# Patient Record
Sex: MALE | Race: WHITE | NOT HISPANIC OR LATINO | URBAN - METROPOLITAN AREA
[De-identification: names, ages, dates, MRNs, and addresses within clinical notes are randomized per-mention and may not be internally consistent; named-entity substitution may affect disease eponyms.]

---

## 2017-04-21 ENCOUNTER — OUTPATIENT (OUTPATIENT)
Dept: OUTPATIENT SERVICES | Facility: HOSPITAL | Age: 67
LOS: 1 days | Discharge: HOME | End: 2017-04-21

## 2017-06-08 ENCOUNTER — OUTPATIENT (OUTPATIENT)
Dept: OUTPATIENT SERVICES | Facility: HOSPITAL | Age: 67
LOS: 1 days | Discharge: HOME | End: 2017-06-08

## 2017-06-08 DIAGNOSIS — S82.843A DISPLACED BIMALLEOLAR FRACTURE OF UNSPECIFIED LOWER LEG, INITIAL ENCOUNTER FOR CLOSED FRACTURE: ICD-10-CM

## 2017-06-28 DIAGNOSIS — Z12.11 ENCOUNTER FOR SCREENING FOR MALIGNANT NEOPLASM OF COLON: ICD-10-CM

## 2017-06-28 DIAGNOSIS — I10 ESSENTIAL (PRIMARY) HYPERTENSION: ICD-10-CM

## 2017-06-28 DIAGNOSIS — K62.89 OTHER SPECIFIED DISEASES OF ANUS AND RECTUM: ICD-10-CM

## 2017-06-28 DIAGNOSIS — E78.00 PURE HYPERCHOLESTEROLEMIA, UNSPECIFIED: ICD-10-CM

## 2017-06-28 DIAGNOSIS — Z85.038 PERSONAL HISTORY OF OTHER MALIGNANT NEOPLASM OF LARGE INTESTINE: ICD-10-CM

## 2017-06-28 DIAGNOSIS — K57.30 DIVERTICULOSIS OF LARGE INTESTINE WITHOUT PERFORATION OR ABSCESS WITHOUT BLEEDING: ICD-10-CM

## 2017-06-28 DIAGNOSIS — K64.9 UNSPECIFIED HEMORRHOIDS: ICD-10-CM

## 2017-09-22 DIAGNOSIS — I25.10 ATHEROSCLEROTIC HEART DISEASE OF NATIVE CORONARY ARTERY WITHOUT ANGINA PECTORIS: ICD-10-CM

## 2017-09-22 DIAGNOSIS — Z79.899 OTHER LONG TERM (CURRENT) DRUG THERAPY: ICD-10-CM

## 2017-09-22 DIAGNOSIS — E78.00 PURE HYPERCHOLESTEROLEMIA, UNSPECIFIED: ICD-10-CM

## 2017-09-30 ENCOUNTER — OUTPATIENT (OUTPATIENT)
Dept: OUTPATIENT SERVICES | Facility: HOSPITAL | Age: 67
LOS: 1 days | Discharge: HOME | End: 2017-09-30

## 2017-09-30 DIAGNOSIS — E78.00 PURE HYPERCHOLESTEROLEMIA, UNSPECIFIED: ICD-10-CM

## 2017-09-30 DIAGNOSIS — Z79.899 OTHER LONG TERM (CURRENT) DRUG THERAPY: ICD-10-CM

## 2017-09-30 DIAGNOSIS — S82.843A DISPLACED BIMALLEOLAR FRACTURE OF UNSPECIFIED LOWER LEG, INITIAL ENCOUNTER FOR CLOSED FRACTURE: ICD-10-CM

## 2017-09-30 DIAGNOSIS — I25.10 ATHEROSCLEROTIC HEART DISEASE OF NATIVE CORONARY ARTERY WITHOUT ANGINA PECTORIS: ICD-10-CM

## 2018-04-10 ENCOUNTER — OUTPATIENT (OUTPATIENT)
Dept: OUTPATIENT SERVICES | Facility: HOSPITAL | Age: 68
LOS: 1 days | Discharge: HOME | End: 2018-04-10

## 2018-04-10 DIAGNOSIS — E78.00 PURE HYPERCHOLESTEROLEMIA, UNSPECIFIED: ICD-10-CM

## 2018-04-10 DIAGNOSIS — I25.10 ATHEROSCLEROTIC HEART DISEASE OF NATIVE CORONARY ARTERY WITHOUT ANGINA PECTORIS: ICD-10-CM

## 2018-04-10 DIAGNOSIS — Z79.899 OTHER LONG TERM (CURRENT) DRUG THERAPY: ICD-10-CM

## 2018-10-31 ENCOUNTER — OUTPATIENT (OUTPATIENT)
Dept: OUTPATIENT SERVICES | Facility: HOSPITAL | Age: 68
LOS: 1 days | Discharge: HOME | End: 2018-10-31

## 2018-10-31 DIAGNOSIS — I25.10 ATHEROSCLEROTIC HEART DISEASE OF NATIVE CORONARY ARTERY WITHOUT ANGINA PECTORIS: ICD-10-CM

## 2018-10-31 DIAGNOSIS — Z79.899 OTHER LONG TERM (CURRENT) DRUG THERAPY: ICD-10-CM

## 2018-10-31 DIAGNOSIS — E78.00 PURE HYPERCHOLESTEROLEMIA, UNSPECIFIED: ICD-10-CM

## 2019-02-28 ENCOUNTER — RESULT REVIEW (OUTPATIENT)
Age: 69
End: 2019-02-28

## 2019-02-28 ENCOUNTER — OUTPATIENT (OUTPATIENT)
Dept: OUTPATIENT SERVICES | Facility: HOSPITAL | Age: 69
LOS: 1 days | Discharge: HOME | End: 2019-02-28

## 2019-02-28 ENCOUNTER — TRANSCRIPTION ENCOUNTER (OUTPATIENT)
Age: 69
End: 2019-02-28

## 2019-02-28 VITALS
RESPIRATION RATE: 18 BRPM | SYSTOLIC BLOOD PRESSURE: 159 MMHG | DIASTOLIC BLOOD PRESSURE: 89 MMHG | TEMPERATURE: 98 F | WEIGHT: 315 LBS | HEART RATE: 83 BPM | HEIGHT: 73 IN

## 2019-02-28 VITALS — RESPIRATION RATE: 18 BRPM | HEART RATE: 67 BPM | DIASTOLIC BLOOD PRESSURE: 65 MMHG | SYSTOLIC BLOOD PRESSURE: 102 MMHG

## 2019-02-28 DIAGNOSIS — M84.371A STRESS FRACTURE, RIGHT ANKLE, INITIAL ENCOUNTER FOR FRACTURE: Chronic | ICD-10-CM

## 2019-02-28 DIAGNOSIS — Z98.890 OTHER SPECIFIED POSTPROCEDURAL STATES: Chronic | ICD-10-CM

## 2019-02-28 NOTE — ASU PATIENT PROFILE, ADULT - PSH
Stress fracture of right ankle  plate and screws H/O colonoscopy  3 years ago rectal cancer  Stress fracture of right ankle  plate and screws

## 2019-03-01 LAB — SURGICAL PATHOLOGY STUDY: SIGNIFICANT CHANGE UP

## 2019-03-06 DIAGNOSIS — K64.8 OTHER HEMORRHOIDS: ICD-10-CM

## 2019-03-06 DIAGNOSIS — C20 MALIGNANT NEOPLASM OF RECTUM: ICD-10-CM

## 2019-03-06 DIAGNOSIS — K57.30 DIVERTICULOSIS OF LARGE INTESTINE WITHOUT PERFORATION OR ABSCESS WITHOUT BLEEDING: ICD-10-CM

## 2019-04-14 PROBLEM — I10 ESSENTIAL (PRIMARY) HYPERTENSION: Chronic | Status: ACTIVE | Noted: 2019-02-28

## 2019-04-14 PROBLEM — C20 MALIGNANT NEOPLASM OF RECTUM: Chronic | Status: ACTIVE | Noted: 2019-02-28

## 2019-04-14 PROBLEM — I48.91 UNSPECIFIED ATRIAL FIBRILLATION: Chronic | Status: ACTIVE | Noted: 2019-02-28

## 2019-04-14 PROBLEM — Z00.00 ENCOUNTER FOR PREVENTIVE HEALTH EXAMINATION: Status: ACTIVE | Noted: 2019-04-14

## 2019-06-06 ENCOUNTER — APPOINTMENT (OUTPATIENT)
Dept: CARDIOLOGY | Facility: CLINIC | Age: 69
End: 2019-06-06
Payer: MEDICARE

## 2019-06-06 PROCEDURE — 93000 ELECTROCARDIOGRAM COMPLETE: CPT

## 2019-06-06 PROCEDURE — 99214 OFFICE O/P EST MOD 30 MIN: CPT

## 2019-11-12 ENCOUNTER — APPOINTMENT (OUTPATIENT)
Dept: CARDIOLOGY | Facility: CLINIC | Age: 69
End: 2019-11-12
Payer: MEDICARE

## 2019-11-12 PROCEDURE — 93880 EXTRACRANIAL BILAT STUDY: CPT

## 2019-11-20 ENCOUNTER — APPOINTMENT (OUTPATIENT)
Dept: CARDIOLOGY | Facility: CLINIC | Age: 69
End: 2019-11-20
Payer: MEDICARE

## 2019-11-20 PROCEDURE — 93306 TTE W/DOPPLER COMPLETE: CPT

## 2019-11-29 ENCOUNTER — LABORATORY RESULT (OUTPATIENT)
Age: 69
End: 2019-11-29

## 2019-11-29 ENCOUNTER — OUTPATIENT (OUTPATIENT)
Dept: OUTPATIENT SERVICES | Facility: HOSPITAL | Age: 69
LOS: 1 days | Discharge: HOME | End: 2019-11-29

## 2019-11-29 DIAGNOSIS — E78.00 PURE HYPERCHOLESTEROLEMIA, UNSPECIFIED: ICD-10-CM

## 2019-11-29 DIAGNOSIS — Z98.890 OTHER SPECIFIED POSTPROCEDURAL STATES: Chronic | ICD-10-CM

## 2019-11-29 DIAGNOSIS — M84.371A STRESS FRACTURE, RIGHT ANKLE, INITIAL ENCOUNTER FOR FRACTURE: Chronic | ICD-10-CM

## 2019-11-29 DIAGNOSIS — Z79.899 OTHER LONG TERM (CURRENT) DRUG THERAPY: ICD-10-CM

## 2019-11-29 DIAGNOSIS — I25.10 ATHEROSCLEROTIC HEART DISEASE OF NATIVE CORONARY ARTERY WITHOUT ANGINA PECTORIS: ICD-10-CM

## 2019-12-05 ENCOUNTER — APPOINTMENT (OUTPATIENT)
Dept: CARDIOLOGY | Facility: CLINIC | Age: 69
End: 2019-12-05
Payer: MEDICARE

## 2019-12-05 PROCEDURE — 99214 OFFICE O/P EST MOD 30 MIN: CPT

## 2019-12-05 PROCEDURE — 93000 ELECTROCARDIOGRAM COMPLETE: CPT

## 2020-02-10 NOTE — ASU PATIENT PROFILE, ADULT - BILL OF RIGHTS/ADMISSION INFORMATION PROVIDED TO:
He should come in for a follow-up.  He should be able to hold aspirin for dental work, and should be on 81 mg daily.
Patient

## 2020-02-27 ENCOUNTER — RESULT REVIEW (OUTPATIENT)
Age: 70
End: 2020-02-27

## 2020-02-27 ENCOUNTER — TRANSCRIPTION ENCOUNTER (OUTPATIENT)
Age: 70
End: 2020-02-27

## 2020-02-27 ENCOUNTER — OUTPATIENT (OUTPATIENT)
Dept: OUTPATIENT SERVICES | Facility: HOSPITAL | Age: 70
LOS: 1 days | Discharge: HOME | End: 2020-02-27
Payer: MEDICARE

## 2020-02-27 VITALS — RESPIRATION RATE: 18 BRPM | DIASTOLIC BLOOD PRESSURE: 64 MMHG | SYSTOLIC BLOOD PRESSURE: 114 MMHG | HEART RATE: 68 BPM

## 2020-02-27 VITALS
DIASTOLIC BLOOD PRESSURE: 74 MMHG | HEART RATE: 73 BPM | TEMPERATURE: 99 F | RESPIRATION RATE: 19 BRPM | HEIGHT: 73 IN | WEIGHT: 315 LBS | SYSTOLIC BLOOD PRESSURE: 144 MMHG

## 2020-02-27 DIAGNOSIS — Z98.890 OTHER SPECIFIED POSTPROCEDURAL STATES: Chronic | ICD-10-CM

## 2020-02-27 DIAGNOSIS — Z12.12 ENCOUNTER FOR SCREENING FOR MALIGNANT NEOPLASM OF RECTUM: ICD-10-CM

## 2020-02-27 DIAGNOSIS — M84.371A STRESS FRACTURE, RIGHT ANKLE, INITIAL ENCOUNTER FOR FRACTURE: Chronic | ICD-10-CM

## 2020-02-27 PROCEDURE — 88305 TISSUE EXAM BY PATHOLOGIST: CPT | Mod: 26

## 2020-02-27 NOTE — H&P PST ADULT - NSICDXPASTSURGICALHX_GEN_ALL_CORE_FT
PAST SURGICAL HISTORY:  H/O colonoscopy 1 YEAR  AGO rectal cancer    Stress fracture of right ankle plate and screws

## 2020-03-02 LAB — SURGICAL PATHOLOGY STUDY: SIGNIFICANT CHANGE UP

## 2020-03-05 DIAGNOSIS — K59.8 OTHER SPECIFIED FUNCTIONAL INTESTINAL DISORDERS: ICD-10-CM

## 2020-03-05 DIAGNOSIS — I48.91 UNSPECIFIED ATRIAL FIBRILLATION: ICD-10-CM

## 2020-03-05 DIAGNOSIS — Z79.01 LONG TERM (CURRENT) USE OF ANTICOAGULANTS: ICD-10-CM

## 2020-03-05 DIAGNOSIS — Z85.048 PERSONAL HISTORY OF OTHER MALIGNANT NEOPLASM OF RECTUM, RECTOSIGMOID JUNCTION, AND ANUS: ICD-10-CM

## 2020-03-05 DIAGNOSIS — Z12.11 ENCOUNTER FOR SCREENING FOR MALIGNANT NEOPLASM OF COLON: ICD-10-CM

## 2020-03-05 DIAGNOSIS — K62.89 OTHER SPECIFIED DISEASES OF ANUS AND RECTUM: ICD-10-CM

## 2020-03-05 DIAGNOSIS — K64.8 OTHER HEMORRHOIDS: ICD-10-CM

## 2020-03-05 DIAGNOSIS — K57.30 DIVERTICULOSIS OF LARGE INTESTINE WITHOUT PERFORATION OR ABSCESS WITHOUT BLEEDING: ICD-10-CM

## 2020-03-05 DIAGNOSIS — I10 ESSENTIAL (PRIMARY) HYPERTENSION: ICD-10-CM

## 2020-06-11 ENCOUNTER — APPOINTMENT (OUTPATIENT)
Dept: CARDIOLOGY | Facility: CLINIC | Age: 70
End: 2020-06-11

## 2020-06-19 ENCOUNTER — LABORATORY RESULT (OUTPATIENT)
Age: 70
End: 2020-06-19

## 2020-06-30 ENCOUNTER — APPOINTMENT (OUTPATIENT)
Dept: CARDIOLOGY | Facility: CLINIC | Age: 70
End: 2020-06-30
Payer: MEDICARE

## 2020-06-30 PROCEDURE — 93000 ELECTROCARDIOGRAM COMPLETE: CPT

## 2020-06-30 PROCEDURE — 99214 OFFICE O/P EST MOD 30 MIN: CPT

## 2020-10-06 ENCOUNTER — RX RENEWAL (OUTPATIENT)
Age: 70
End: 2020-10-06

## 2020-10-08 ENCOUNTER — RECORD ABSTRACTING (OUTPATIENT)
Age: 70
End: 2020-10-08

## 2020-10-08 DIAGNOSIS — I36.0 NONRHEUMATIC TRICUSPID (VALVE) STENOSIS: ICD-10-CM

## 2020-10-08 DIAGNOSIS — I65.23 OCCLUSION AND STENOSIS OF BILATERAL CAROTID ARTERIES: ICD-10-CM

## 2020-10-08 DIAGNOSIS — I34.0 NONRHEUMATIC MITRAL (VALVE) INSUFFICIENCY: ICD-10-CM

## 2020-10-10 ENCOUNTER — INPATIENT (INPATIENT)
Facility: HOSPITAL | Age: 70
LOS: 3 days | Discharge: HOME HEALTH | End: 2020-10-14
Attending: INTERNAL MEDICINE | Admitting: INTERNAL MEDICINE
Payer: MEDICARE

## 2020-10-10 VITALS
HEIGHT: 73 IN | HEART RATE: 83 BPM | DIASTOLIC BLOOD PRESSURE: 62 MMHG | SYSTOLIC BLOOD PRESSURE: 116 MMHG | TEMPERATURE: 96 F | OXYGEN SATURATION: 93 % | WEIGHT: 315 LBS | RESPIRATION RATE: 18 BRPM

## 2020-10-10 DIAGNOSIS — M84.371A STRESS FRACTURE, RIGHT ANKLE, INITIAL ENCOUNTER FOR FRACTURE: Chronic | ICD-10-CM

## 2020-10-10 DIAGNOSIS — Z98.890 OTHER SPECIFIED POSTPROCEDURAL STATES: Chronic | ICD-10-CM

## 2020-10-10 LAB
ALBUMIN SERPL ELPH-MCNC: 3.9 G/DL — SIGNIFICANT CHANGE UP (ref 3.5–5.2)
ALP SERPL-CCNC: 89 U/L — SIGNIFICANT CHANGE UP (ref 30–115)
ALT FLD-CCNC: 15 U/L — SIGNIFICANT CHANGE UP (ref 0–41)
ANION GAP SERPL CALC-SCNC: 14 MMOL/L — SIGNIFICANT CHANGE UP (ref 7–14)
APTT BLD: 29.6 SEC — SIGNIFICANT CHANGE UP (ref 27–39.2)
AST SERPL-CCNC: 25 U/L — SIGNIFICANT CHANGE UP (ref 0–41)
BASOPHILS # BLD AUTO: 0.05 K/UL — SIGNIFICANT CHANGE UP (ref 0–0.2)
BASOPHILS NFR BLD AUTO: 0.4 % — SIGNIFICANT CHANGE UP (ref 0–1)
BILIRUB SERPL-MCNC: 0.4 MG/DL — SIGNIFICANT CHANGE UP (ref 0.2–1.2)
BUN SERPL-MCNC: 17 MG/DL — SIGNIFICANT CHANGE UP (ref 10–20)
CALCIUM SERPL-MCNC: 8.9 MG/DL — SIGNIFICANT CHANGE UP (ref 8.5–10.1)
CHLORIDE SERPL-SCNC: 104 MMOL/L — SIGNIFICANT CHANGE UP (ref 98–110)
CO2 SERPL-SCNC: 22 MMOL/L — SIGNIFICANT CHANGE UP (ref 17–32)
CREAT SERPL-MCNC: 1.5 MG/DL — SIGNIFICANT CHANGE UP (ref 0.7–1.5)
EOSINOPHIL # BLD AUTO: 0.08 K/UL — SIGNIFICANT CHANGE UP (ref 0–0.7)
EOSINOPHIL NFR BLD AUTO: 0.6 % — SIGNIFICANT CHANGE UP (ref 0–8)
GLUCOSE SERPL-MCNC: 137 MG/DL — HIGH (ref 70–99)
HCT VFR BLD CALC: 36.4 % — LOW (ref 42–52)
HGB BLD-MCNC: 12.2 G/DL — LOW (ref 14–18)
IMM GRANULOCYTES NFR BLD AUTO: 1.1 % — HIGH (ref 0.1–0.3)
INR BLD: 1.4 RATIO — HIGH (ref 0.65–1.3)
LACTATE SERPL-SCNC: 3.5 MMOL/L — HIGH (ref 0.7–2)
LYMPHOCYTES # BLD AUTO: 1.24 K/UL — SIGNIFICANT CHANGE UP (ref 1.2–3.4)
LYMPHOCYTES # BLD AUTO: 9.5 % — LOW (ref 20.5–51.1)
MAGNESIUM SERPL-MCNC: 2 MG/DL — SIGNIFICANT CHANGE UP (ref 1.8–2.4)
MCHC RBC-ENTMCNC: 31.4 PG — HIGH (ref 27–31)
MCHC RBC-ENTMCNC: 33.5 G/DL — SIGNIFICANT CHANGE UP (ref 32–37)
MCV RBC AUTO: 93.8 FL — SIGNIFICANT CHANGE UP (ref 80–94)
MONOCYTES # BLD AUTO: 0.94 K/UL — HIGH (ref 0.1–0.6)
MONOCYTES NFR BLD AUTO: 7.2 % — SIGNIFICANT CHANGE UP (ref 1.7–9.3)
NEUTROPHILS # BLD AUTO: 10.64 K/UL — HIGH (ref 1.4–6.5)
NEUTROPHILS NFR BLD AUTO: 81.2 % — HIGH (ref 42.2–75.2)
NRBC # BLD: 0 /100 WBCS — SIGNIFICANT CHANGE UP (ref 0–0)
PLATELET # BLD AUTO: 251 K/UL — SIGNIFICANT CHANGE UP (ref 130–400)
POTASSIUM SERPL-MCNC: 4.5 MMOL/L — SIGNIFICANT CHANGE UP (ref 3.5–5)
POTASSIUM SERPL-SCNC: 4.5 MMOL/L — SIGNIFICANT CHANGE UP (ref 3.5–5)
PROT SERPL-MCNC: 6.6 G/DL — SIGNIFICANT CHANGE UP (ref 6–8)
PROTHROM AB SERPL-ACNC: 16.1 SEC — HIGH (ref 9.95–12.87)
RAPID RVP RESULT: SIGNIFICANT CHANGE UP
RBC # BLD: 3.88 M/UL — LOW (ref 4.7–6.1)
RBC # FLD: 13.2 % — SIGNIFICANT CHANGE UP (ref 11.5–14.5)
SARS-COV-2 RNA SPEC QL NAA+PROBE: SIGNIFICANT CHANGE UP
SODIUM SERPL-SCNC: 140 MMOL/L — SIGNIFICANT CHANGE UP (ref 135–146)
TROPONIN T SERPL-MCNC: <0.01 NG/ML — SIGNIFICANT CHANGE UP
WBC # BLD: 13.1 K/UL — HIGH (ref 4.8–10.8)
WBC # FLD AUTO: 13.1 K/UL — HIGH (ref 4.8–10.8)

## 2020-10-10 PROCEDURE — 70450 CT HEAD/BRAIN W/O DYE: CPT | Mod: 26

## 2020-10-10 RX ORDER — TETANUS TOXOID, REDUCED DIPHTHERIA TOXOID AND ACELLULAR PERTUSSIS VACCINE, ADSORBED 5; 2.5; 8; 8; 2.5 [IU]/.5ML; [IU]/.5ML; UG/.5ML; UG/.5ML; UG/.5ML
0.5 SUSPENSION INTRAMUSCULAR ONCE
Refills: 0 | Status: COMPLETED | OUTPATIENT
Start: 2020-10-10 | End: 2020-10-10

## 2020-10-10 RX ORDER — ACETAMINOPHEN 500 MG
975 TABLET ORAL ONCE
Refills: 0 | Status: COMPLETED | OUTPATIENT
Start: 2020-10-10 | End: 2020-10-10

## 2020-10-10 RX ORDER — SODIUM CHLORIDE 9 MG/ML
1000 INJECTION INTRAMUSCULAR; INTRAVENOUS; SUBCUTANEOUS ONCE
Refills: 0 | Status: COMPLETED | OUTPATIENT
Start: 2020-10-10 | End: 2020-10-10

## 2020-10-10 RX ADMIN — Medication 975 MILLIGRAM(S): at 22:05

## 2020-10-10 RX ADMIN — SODIUM CHLORIDE 1000 MILLILITER(S): 9 INJECTION INTRAMUSCULAR; INTRAVENOUS; SUBCUTANEOUS at 19:53

## 2020-10-10 RX ADMIN — Medication 975 MILLIGRAM(S): at 18:11

## 2020-10-10 RX ADMIN — SODIUM CHLORIDE 1000 MILLILITER(S): 9 INJECTION INTRAMUSCULAR; INTRAVENOUS; SUBCUTANEOUS at 21:52

## 2020-10-10 RX ADMIN — Medication 975 MILLIGRAM(S): at 19:00

## 2020-10-10 RX ADMIN — TETANUS TOXOID, REDUCED DIPHTHERIA TOXOID AND ACELLULAR PERTUSSIS VACCINE, ADSORBED 0.5 MILLILITER(S): 5; 2.5; 8; 8; 2.5 SUSPENSION INTRAMUSCULAR at 17:44

## 2020-10-10 RX ADMIN — SODIUM CHLORIDE 1000 MILLILITER(S): 9 INJECTION INTRAMUSCULAR; INTRAVENOUS; SUBCUTANEOUS at 19:12

## 2020-10-10 NOTE — ED PROVIDER NOTE - ATTENDING CONTRIBUTION TO CARE
71yo M history of HTN DL rectal ca in remission sp chemo/rad, AFib on Xarelto presenting with epistaxis. Per pt, had accidentally struck his head 1wk ago while working in the garage and standing up. Sustained some bruising to L cheek. no LOC, no other injuries. Per pt, he was in the bathroom approx 20min PTA when he began to have spontaneous epistaxis from R nare. Came in for further eval. Per pt and wife, no new trauma since last week. No other complaints. No HA vision changes neck pain/stiffness, chest pain, shortness of breath, abdominal pain, numbness/focal weakness. Unknown last tdap  Constitutional: Well appearing. No acute distress. Non toxic.   Head: +abrasions and ecchymosis to L face  Eyes: PERRLA. Extraocular movements intact, no entrapment. Conjunctiva normal.   Ears: No mastoid tenderness.  Nose: +R nare nasal polyp, small oozing. No septal hematoma.   Throat: +MMM. No posterior oropharyngeal erythema, edema, lesions.  Uvula midline. Floor of mouth soft. Dried blood around lips. No blood in posterior pharynx. mild ecchymosis to R lower lip.   Neck: Supple, non tender, full range of motion.  CV: RRR no murmurs, rubs, or gallops. +S1S2.   Pulm: Clear to auscultation bilaterally. Normal work of breathing.  Abd: soft NT ND +BS.   Ext: Warm and well perfused x4, moving all extremities, no edema.   Psy: Cooperative, appropriate.   Skin: Warm, dry, no rash  Neuro: CN2-12 grossly intact no sensory or motor deficits throughout, no drift, no ataxia

## 2020-10-10 NOTE — ED PROVIDER NOTE - NS ED ROS FT
The patient is a 28y Male complaining of
Review of Systems    Constitutional: (-) fever, (-) chills  Eyes/ENT: (-) blurry vision, (+) epistaxis, (-) sore throat  Cardiovascular: (-) chest pain, (-) syncope  Respiratory: (-) cough, (-) shortness of breath  Gastrointestinal: (-) pain, (-) nausea, (-) vomiting, (-) diarrhea  Musculoskeletal: (-) neck pain, (-) back pain, (-) body aches  Integumentary: (-) rash, (-) edema  Neurological: (-) headache, (-) altered mental status  Psychiatric: (-) hallucinations  Allergic/Immunologic: (-) pruritus

## 2020-10-10 NOTE — ED PROVIDER NOTE - OBJECTIVE STATEMENT
69 yo M hx of A-fib on Xarelto c/o nose bleed today from right nostril. Patient also reports that he hit his head on the garage while getting up 1 week ago. Patient states he might have scraped his face while shaving. Last tetanus unknown. No  HA, dizzy, LOC, n/v, or change in visions.

## 2020-10-10 NOTE — ED PROVIDER NOTE - PROGRESS NOTE DETAILS
sp returning from CT, pt sitting in stretcher, c/o dizziness. BP 80s. remaining vs within normal limits. FS nml. quikclot and tongue depressors removed, no reoozing at this time. pt with no other new complaints. no ha vision changes, cp, sob. labs ordered. ivf bolus running. will cont reeval. pt s/o Dr. Magdaleno pending labs/reeval Patient still dizzy after 1 liter of IVF. Labs checked.  Lactate 4.5.  Will admit. Patient still dizzy after 1 liter of IVF. Labs checked.  Lactate 4.5.  Will admit. Patient states that abrasions just happened and could not remember how it happened. Dr. Simon accepts admission. Called by radiology for CT cervical spine finding of possible C2 fracture. Placed patient on hard collar. Will transfer [place] for trauma evaluation. Discussed with Dr. Bernardo. MQ: Pending CTA neck, neurosurgery consulted, trauma team saw patient patient stable during transfer, patient evaluated here for c2 fracture. on exam patient reports synocpal event. lab work was significant for elevated lactate. Nsg requesting cta of neck given location of fx. WF: received s/o from resident sandoval, pending CTA head and neck and neurosurg and trauma recs. pt vitals stable, NAD. WF: received s/o from resident sandoval, pending CTA head and neck and neurosurg and trauma recs. pt vitals stable, NAD. As per pt, he was found in bathroom yesterday by wife and thinks he syncopized after standing up, was started on benazepril recently. denies pain currently. WF: received s/o from resident sandoval, pending CTA H/N read, if negative will admit to medicine for syncope work-up. NAD. As per pt, he was found in bathroom yesterday by wife and thinks he syncopized after standing up. denies pain currently. will check orthostatics/ WF: received s/o from resident sandoval, pending CTA H/N read, if negative will admit to medicine for syncope work-up. NAD. As per pt, he was found in bathroom yesterday by wife and thinks he syncopized after standing up. denies pain currently. negative for orthostatic hypotension. WF: received s/o from resident sandoval, pending CTA H/N read, if negative will admit to medicine for syncope work-up. NAD, no neuro deficits. As per pt, he was found in bathroom yesterday by wife and thinks he syncopized after standing up. denies pain currently. negative for orthostatic hypotension. Bradley: pt is NAD, comfortable. awaiting CTA result.

## 2020-10-10 NOTE — ED PROVIDER NOTE - CARE PLAN
Principal Discharge DX:	Syncope  Secondary Diagnosis:	Facial abrasion  Secondary Diagnosis:	Closed head injury   Principal Discharge DX:	C2 cervical fracture  Secondary Diagnosis:	Facial abrasion  Secondary Diagnosis:	Closed head injury  Secondary Diagnosis:	Syncope

## 2020-10-10 NOTE — ED ADULT NURSE NOTE - NSIMPLEMENTINTERV_GEN_ALL_ED
Implemented All Fall Risk Interventions:  [place] to call system. Call bell, personal items and telephone within reach. Instruct patient to call for assistance. Room bathroom lighting operational. Non-slip footwear when patient is off stretcher. Physically safe environment: no spills, clutter or unnecessary equipment. Stretcher in lowest position, wheels locked, appropriate side rails in place. Provide visual cue, wrist band, yellow gown, etc. Monitor gait and stability. Monitor for mental status changes and reorient to person, place, and time. Review medications for side effects contributing to fall risk. Reinforce activity limits and safety measures with patient and family.

## 2020-10-10 NOTE — ED PROVIDER NOTE - CLINICAL SUMMARY MEDICAL DECISION MAKING FREE TEXT BOX
Chart reviewed. H/O afib, on Xarelto, HTN, was in the basement in his computer and passed out and hit face. EKG NSR, no stemi. Pan CT negative. Will admit to low risk tele. Chart reviewed. H/O afib, on Xarelto, HTN, was in the basement in his computer and passed out and hit face, transient epistaxis resolved with txa. EKG NSR, no stemi. Pan CT negative except possible C2 fracture. Will transfer North for trauma evaluation. Placed on hard collar. Chart reviewed. H/O afib, on Xarelto, HTN, was in the basement on his computer and passed out and hit face, transient epistaxis resolved with txa in ED. EKG NSR, no stemi. Pan CT negative except possible C2 fracture. Will transfer North for trauma evaluation. Placed on hard collar. Chart reviewed. H/O afib, on Xarelto, HTN, was in the basement on his computer and passed out and hit face, transient epistaxis resolved with txa in ED. EKG NSR, no stemi. Pan CT negative except possible C2 fracture. Will transfer North for trauma evaluation. Placed on hard collar. At [facility] pt seen by surgery and neurosurgery. CTA cspine CT frx w/o vessel injury. pt admitted to tele on surgery service. No acute ED events, pt comfortable

## 2020-10-10 NOTE — ED ADULT TRIAGE NOTE - CHIEF COMPLAINT QUOTE
nose bleed starting one hour pta spontaneous and left sided facial bruising and abrasions. pt on blood thinners.

## 2020-10-11 LAB
APPEARANCE UR: CLEAR — SIGNIFICANT CHANGE UP
BASOPHILS # BLD AUTO: 0.03 K/UL — SIGNIFICANT CHANGE UP (ref 0–0.2)
BASOPHILS NFR BLD AUTO: 0.4 % — SIGNIFICANT CHANGE UP (ref 0–1)
BILIRUB UR-MCNC: NEGATIVE — SIGNIFICANT CHANGE UP
BLD GP AB SCN SERPL QL: SIGNIFICANT CHANGE UP
COLOR SPEC: SIGNIFICANT CHANGE UP
DIFF PNL FLD: NEGATIVE — SIGNIFICANT CHANGE UP
EOSINOPHIL # BLD AUTO: 0.05 K/UL — SIGNIFICANT CHANGE UP (ref 0–0.7)
EOSINOPHIL NFR BLD AUTO: 0.7 % — SIGNIFICANT CHANGE UP (ref 0–8)
GLUCOSE BLDC GLUCOMTR-MCNC: 124 MG/DL — HIGH (ref 70–99)
GLUCOSE UR QL: NEGATIVE — SIGNIFICANT CHANGE UP
HCT VFR BLD CALC: 34.6 % — LOW (ref 42–52)
HGB BLD-MCNC: 11.3 G/DL — LOW (ref 14–18)
IMM GRANULOCYTES NFR BLD AUTO: 0.4 % — HIGH (ref 0.1–0.3)
KETONES UR-MCNC: NEGATIVE — SIGNIFICANT CHANGE UP
LACTATE SERPL-SCNC: 1.8 MMOL/L — SIGNIFICANT CHANGE UP (ref 0.7–2)
LEUKOCYTE ESTERASE UR-ACNC: NEGATIVE — SIGNIFICANT CHANGE UP
LYMPHOCYTES # BLD AUTO: 0.73 K/UL — LOW (ref 1.2–3.4)
LYMPHOCYTES # BLD AUTO: 10.4 % — LOW (ref 20.5–51.1)
MCHC RBC-ENTMCNC: 30.6 PG — SIGNIFICANT CHANGE UP (ref 27–31)
MCHC RBC-ENTMCNC: 32.7 G/DL — SIGNIFICANT CHANGE UP (ref 32–37)
MCV RBC AUTO: 93.8 FL — SIGNIFICANT CHANGE UP (ref 80–94)
MONOCYTES # BLD AUTO: 0.63 K/UL — HIGH (ref 0.1–0.6)
MONOCYTES NFR BLD AUTO: 9 % — SIGNIFICANT CHANGE UP (ref 1.7–9.3)
NEUTROPHILS # BLD AUTO: 5.52 K/UL — SIGNIFICANT CHANGE UP (ref 1.4–6.5)
NEUTROPHILS NFR BLD AUTO: 79.1 % — HIGH (ref 42.2–75.2)
NITRITE UR-MCNC: NEGATIVE — SIGNIFICANT CHANGE UP
NRBC # BLD: 0 /100 WBCS — SIGNIFICANT CHANGE UP (ref 0–0)
PH UR: 6 — SIGNIFICANT CHANGE UP (ref 5–8)
PLATELET # BLD AUTO: 203 K/UL — SIGNIFICANT CHANGE UP (ref 130–400)
PROT UR-MCNC: SIGNIFICANT CHANGE UP
RBC # BLD: 3.69 M/UL — LOW (ref 4.7–6.1)
RBC # FLD: 13.2 % — SIGNIFICANT CHANGE UP (ref 11.5–14.5)
SP GR SPEC: 1.03 — HIGH (ref 1.01–1.03)
TROPONIN T SERPL-MCNC: <0.01 NG/ML — SIGNIFICANT CHANGE UP
UROBILINOGEN FLD QL: SIGNIFICANT CHANGE UP
WBC # BLD: 6.99 K/UL — SIGNIFICANT CHANGE UP (ref 4.8–10.8)
WBC # FLD AUTO: 6.99 K/UL — SIGNIFICANT CHANGE UP (ref 4.8–10.8)

## 2020-10-11 PROCEDURE — 71260 CT THORAX DX C+: CPT | Mod: 26

## 2020-10-11 PROCEDURE — 72141 MRI NECK SPINE W/O DYE: CPT | Mod: 26

## 2020-10-11 PROCEDURE — 93010 ELECTROCARDIOGRAM REPORT: CPT

## 2020-10-11 PROCEDURE — 72125 CT NECK SPINE W/O DYE: CPT | Mod: 26

## 2020-10-11 PROCEDURE — 70498 CT ANGIOGRAPHY NECK: CPT | Mod: 26

## 2020-10-11 PROCEDURE — 74177 CT ABD & PELVIS W/CONTRAST: CPT | Mod: 26

## 2020-10-11 RX ORDER — RIVAROXABAN 15 MG-20MG
1 KIT ORAL
Qty: 0 | Refills: 0 | DISCHARGE

## 2020-10-11 RX ORDER — OXYCODONE HYDROCHLORIDE 5 MG/1
5 TABLET ORAL EVERY 6 HOURS
Refills: 0 | Status: DISCONTINUED | OUTPATIENT
Start: 2020-10-11 | End: 2020-10-14

## 2020-10-11 RX ORDER — PANTOPRAZOLE SODIUM 20 MG/1
40 TABLET, DELAYED RELEASE ORAL
Refills: 0 | Status: DISCONTINUED | OUTPATIENT
Start: 2020-10-11 | End: 2020-10-14

## 2020-10-11 RX ORDER — BENAZEPRIL HYDROCHLORIDE 40 MG/1
1 TABLET ORAL
Qty: 0 | Refills: 0 | DISCHARGE

## 2020-10-11 RX ORDER — METOPROLOL TARTRATE 50 MG
50 TABLET ORAL
Refills: 0 | Status: DISCONTINUED | OUTPATIENT
Start: 2020-10-11 | End: 2020-10-14

## 2020-10-11 RX ORDER — LISINOPRIL 2.5 MG/1
40 TABLET ORAL DAILY
Refills: 0 | Status: DISCONTINUED | OUTPATIENT
Start: 2020-10-11 | End: 2020-10-14

## 2020-10-11 RX ORDER — SODIUM CHLORIDE 9 MG/ML
1000 INJECTION INTRAMUSCULAR; INTRAVENOUS; SUBCUTANEOUS
Refills: 0 | Status: DISCONTINUED | OUTPATIENT
Start: 2020-10-11 | End: 2020-10-11

## 2020-10-11 RX ORDER — SIMVASTATIN 20 MG/1
1 TABLET, FILM COATED ORAL
Qty: 0 | Refills: 0 | DISCHARGE

## 2020-10-11 RX ORDER — HEPARIN SODIUM 5000 [USP'U]/ML
5000 INJECTION INTRAVENOUS; SUBCUTANEOUS EVERY 8 HOURS
Refills: 0 | Status: DISCONTINUED | OUTPATIENT
Start: 2020-10-11 | End: 2020-10-14

## 2020-10-11 RX ORDER — CHLORHEXIDINE GLUCONATE 213 G/1000ML
1 SOLUTION TOPICAL
Refills: 0 | Status: DISCONTINUED | OUTPATIENT
Start: 2020-10-11 | End: 2020-10-14

## 2020-10-11 RX ORDER — SIMVASTATIN 20 MG/1
40 TABLET, FILM COATED ORAL AT BEDTIME
Refills: 0 | Status: DISCONTINUED | OUTPATIENT
Start: 2020-10-11 | End: 2020-10-14

## 2020-10-11 RX ORDER — METOPROLOL TARTRATE 50 MG
1 TABLET ORAL
Qty: 0 | Refills: 0 | DISCHARGE

## 2020-10-11 RX ORDER — DRONEDARONE 400 MG/1
400 TABLET, FILM COATED ORAL
Refills: 0 | Status: DISCONTINUED | OUTPATIENT
Start: 2020-10-11 | End: 2020-10-14

## 2020-10-11 RX ORDER — DRONEDARONE 400 MG/1
1 TABLET, FILM COATED ORAL
Qty: 0 | Refills: 0 | DISCHARGE

## 2020-10-11 RX ORDER — ACETAMINOPHEN 500 MG
650 TABLET ORAL EVERY 6 HOURS
Refills: 0 | Status: DISCONTINUED | OUTPATIENT
Start: 2020-10-11 | End: 2020-10-14

## 2020-10-11 RX ORDER — ACETAMINOPHEN 500 MG
650 TABLET ORAL ONCE
Refills: 0 | Status: COMPLETED | OUTPATIENT
Start: 2020-10-11 | End: 2020-10-11

## 2020-10-11 RX ADMIN — OXYCODONE HYDROCHLORIDE 5 MILLIGRAM(S): 5 TABLET ORAL at 16:12

## 2020-10-11 RX ADMIN — SIMVASTATIN 40 MILLIGRAM(S): 20 TABLET, FILM COATED ORAL at 21:53

## 2020-10-11 RX ADMIN — LISINOPRIL 40 MILLIGRAM(S): 2.5 TABLET ORAL at 14:39

## 2020-10-11 RX ADMIN — Medication 975 MILLIGRAM(S): at 00:27

## 2020-10-11 RX ADMIN — OXYCODONE HYDROCHLORIDE 5 MILLIGRAM(S): 5 TABLET ORAL at 23:26

## 2020-10-11 RX ADMIN — OXYCODONE HYDROCHLORIDE 5 MILLIGRAM(S): 5 TABLET ORAL at 22:40

## 2020-10-11 RX ADMIN — HEPARIN SODIUM 5000 UNIT(S): 5000 INJECTION INTRAVENOUS; SUBCUTANEOUS at 21:53

## 2020-10-11 RX ADMIN — Medication 650 MILLIGRAM(S): at 11:27

## 2020-10-11 RX ADMIN — Medication 50 MILLIGRAM(S): at 18:02

## 2020-10-11 RX ADMIN — HEPARIN SODIUM 5000 UNIT(S): 5000 INJECTION INTRAVENOUS; SUBCUTANEOUS at 15:00

## 2020-10-11 RX ADMIN — OXYCODONE HYDROCHLORIDE 5 MILLIGRAM(S): 5 TABLET ORAL at 18:34

## 2020-10-11 RX ADMIN — DRONEDARONE 400 MILLIGRAM(S): 400 TABLET, FILM COATED ORAL at 18:02

## 2020-10-11 NOTE — ED ADULT NURSE REASSESSMENT NOTE - NS ED NURSE REASSESS COMMENT FT1
Pt received with EMS from [facility], in no acute distress, speaking in full sentences, A&Ox3. Vitals updated, pt transferred to Trauma 2. Pt received with EMS from [facility], with C-collar in place, in no acute distress, speaking in full sentences, A&Ox3. Vitals updated, pt transferred to Trauma 2.

## 2020-10-11 NOTE — ED ADULT NURSE REASSESSMENT NOTE - NS ED NURSE REASSESS COMMENT FT1
Pt A&O4, pt currently reports slight discomfort in back/neck area. [place]-J in place. CTA results pending, & pt is awaiting MRI. Continuous cardiac monitoring maintained. Will continue to monitor.

## 2020-10-11 NOTE — H&P ADULT - HISTORY OF PRESENT ILLNESS
70y old m  with pmhx hypertension, a fib on eliquis s/p syncope, +ht, +loc, +eliquis. The patient reports dizziness after taking benazepril (not uncommon), he stood up too quickly and had a syncopal episode. The patient reports +ht,+loc. The patient went to [facility], work up revealed a questionable C2 fracture, the patient was transferred north for further trauma work up and neurosurgery evaluation. In ED [place], the patient reports back pain, has an abrasion to his left forehead, otherwise no other external signs of trauma. Neurosurgery was consulted and recommended MRI, Continue denisse ASCENCIO, CTA of the neck. MRI was performed demonstrating previously noted C2 fracture, ligamentous injury can not be ruled out, acute fractures of T1. T2, T3 fractures, neurosurgery will review with attending. The patient will be admitted to [facility] for syncope work up, pt/rehab and cardiology/medicine consult. 70y old m  with pmhx hypertension, a fib on eliquis s/p syncope, +ht, +loc, +eliquis. The patient reports dizziness after taking benazepril (not uncommon), he stood up too quickly and had a syncopal episode. The patient reports +ht,+loc. The patient went to [facility], work up revealed a questionable C2 fracture, the patient was transferred north for further trauma work up and neurosurgery evaluation. In ED [place], the patient reports back pain, has an abrasion to his left forehead, otherwise no other external signs of trauma. Neurosurgery was consulted and recommended MRI, Continue denisse ASCENCIO, CTA of the neck. MRI was performed demonstrating previously noted C2 fracture, ligamentous injury can not be ruled out, acute fractures of T1. T2, T3 fractures, neurosurgery will review with attending. The patient will be admitted to [facility] for syncope work up, pt/rehab and cardiology/medicine consult.

## 2020-10-11 NOTE — CONSULT NOTE ADULT - ASSESSMENT
ASSESSMENT:  70y old m s/p syncope and fall, +ht, +loc, +eliquis (a fib), transferred from [facility] for neurosurgery and trauma evaluation for suspected C2 fracture (preliminary reads)    PLAN:    - Will await final reads to determine disposition  - Neurosurgery : CTA neck, MRI c spine, continue [facility]  - Trauma labs  -  d/w Dr. Ramirez

## 2020-10-11 NOTE — ED ADULT NURSE REASSESSMENT NOTE - NS ED NURSE REASSESS COMMENT FT1
pt results came back, cervical collar applied, pt to be transported to [place] ED for evaluation, report given to charge nurse Susana TRUJILLO

## 2020-10-11 NOTE — CONSULT NOTE ADULT - ASSESSMENT
70 y.o male patient with PMH of atrial fibrillation, HTN, rectal cancer s/p chemo therapy and radiation therapy 4 years ago, who was brought in after a syncope and fall.    # Syncope  - Suspicious for cardiac etiology of syncope (No prodromal symptoms, trauma, no seizure-like activity)  - Complete amnesia of episode  - Continue telemetry  - Check 2D echo  - Check orthostatics to R/O orthostatic hypotension  - EP evaluation for Loop recorder if no events recorded  - Consider neurology evaluation    # Atrial fibrillation  - CHADSVASC 2 (HTN and age)  - Currently rhythm controlled  - Check 2D echo  - Continue metoprolol  - Continue Multaq (Check daily EKGs for QTc)  - Restart Xarelto for anticoagulation as soon as safe and possible (when ok with neurosurgery)    # HTN  - Continue metoprolol and ACE-I for now  - Monitor renal function and electrolytes (patient's creatinine is 1.5, previously was between 1.0 and 1.3); if JANKI, D/C ACE-I      *** Final recommendations by attending to follow ***   70 y.o male patient with PMH of atrial fibrillation, HTN, rectal cancer s/p chemo therapy and radiation therapy 4 years ago, who was brought in after a syncope and fall.    # Syncope  - Suspicious for cardiac etiology of syncope (No prodromal symptoms, trauma, no seizure-like activity)  - Complete amnesia of episode  - Continue telemetry  - Check 2D echo  - Check orthostatics to R/O orthostatic hypotension  - EP evaluation for Loop recorder if no events recorded  - Consider neurology evaluation    # Paroxysmal Atrial fibrillation - Patient is currently in sinus rhythm  - CHADSVASC 2 (HTN and age)  - Currently rhythm controlled  - Check 2D echo  - Continue metoprolol  - Continue Multaq (Check daily EKGs for QTc)      Please reconsult as needed  - Restart Xarelto for anticoagulation as soon as safe and possible (when ok with neurosurgery)    # HTN  - Continue metoprolol and ACE-I for now  - Monitor renal function and electrolytes (patient's creatinine is 1.5, previously was between 1.0 and 1.3); if JANKI, D/C ACE-I

## 2020-10-11 NOTE — H&P ADULT - ASSESSMENT
`70y old m s/p syncope and fall, +ht, +loc, +eliquis (a fib), transferred from [facility] for neurosurgery and trauma evaluation for C2 fracture, CTA of neck performed, no vascular injury. MRI obtained shows C2 fracture, acute T1, T2, T3 fractures. The patient will be admitted for syncope work up, neurosurgery evaluation and PT/Rehab  -Follow up neurosurgery recommendations for MRI results (informed of the above), keep on bed rest for now, will maintain [place] J until neurosurgery re-evaluates. Aware of negative CTA neck results.  -Admission to telemetry unit for syncope work up, will obtain orthostatics, echocardiogram and evaluation by cardiology (Dr. Pacheco)  -Medicine consult (pcp Dr. Sorto)  -Will hold Xarelto, continue all other home medications, confirmed medications with pharmacy  -Keep NPO until cleared by neurosurgery, ivf  -Pain control  -PT/Rehab once cleared by neurosurgery `70y old m s/p syncope and fall, +ht, +loc, +eliquis (a fib), transferred from [facility] for neurosurgery and trauma evaluation for C2 fracture, CTA of neck performed, no vascular injury. MRI obtained shows C2 fracture, acute T1, T2, T3 fractures. The patient will be admitted for syncope work up, neurosurgery evaluation and PT/Rehab    -Follow up neurosurgery recommendations for MRI results (informed of the above), keep on bed rest for now, will maintain [place] J until neurosurgery re-evaluates. Aware of negative CTA neck results.  -Admission to telemetry unit for syncope work up, will obtain orthostatics, echocardiogram and evaluation by cardiology (Dr. Pacheco)  -Medicine consult (pcp Dr. Sorto)  -Will hold Xarelto, continue all other home medications, confirmed medications with pharmacy  -Keep NPO until cleared by neurosurgery, ivf  -Pain control  -PT/Rehab once cleared by neurosurgery    Senior Resident Note:  - Above Note has been Reviewed and Edited.

## 2020-10-11 NOTE — CONSULT NOTE ADULT - SUBJECTIVE AND OBJECTIVE BOX
GET DURAN  [id number]    Current Issues: Pt is a 69y/o M with AFib on Xarelto presenting to the ED s/p fall down 13 steps yesterday afternoon +LOC for unknown amt of time, unknown HT. Pt states he woke up later and had some neck and back pain. Neurosurgery consulted for questionable C2 vertebral body fx extending from lateral aspect to vertebral foramen. Pt at this time admits to neck pain upon ROM, but denies any blurry vision, nausea, vomiting, numbness, tingling, or radicular pain.    PAST MEDICAL & SURGICAL HISTORY:  Rectal cancer  Afib  HTN (hypertension)  H/O colonoscopy  1 YEAR  AGO rectal cancer  Stress fracture of right ankle  plate and screws    Allergies  No Known Allergies    Home Medications:  benazepril 10 mg oral tablet: 1 tab(s) orally once a day (27 Feb 2020 09:52)  metoprolol tartrate 50 mg oral tablet: 1 tab(s) orally 2 times a day (27 Feb 2020 09:52)  simvastatin 40 mg oral tablet: 1 tab(s) orally once a day (at bedtime) (27 Feb 2020 09:52)  Xarelto 2.5 mg oral tablet: 1 tab(s) orally 2 times a day (27 Feb 2020 09:52)    Vital Signs Last 24 Hrs  T(C): 37.2 (11 Oct 2020 05:53), Max: 37.2 (11 Oct 2020 05:53)  T(F): 99 (11 Oct 2020 05:53), Max: 99 (11 Oct 2020 05:53)  HR: 100 (11 Oct 2020 05:53) (65 - 100)  BP: 177/81 (11 Oct 2020 05:53) (104/51 - 177/81)  RR: 16 (11 Oct 2020 05:53) (16 - 18)  SpO2: 99% (11 Oct 2020 05:53) (93% - 99%)    PHYSICAL EXAM:  AAOX3. Verbal function intact  Follows commands  tongue midline, facial motions symmetric  PERRL, EOMI  Pronator Drift: NONE  Finger to Nose intact  Motor: MAEx4, 5/5 power in b/l UE and LE. Neck ROM intact but with slight pain. No point tenderness elicited.   Sensation: intact to touch in all extremities    LABS:                     12.2   13.10 )-----------( 251      ( 10 Oct 2020 18:56 )             36.4     10-10    140  |  104  |  17  ----------------------------<  137<H>  4.5   |  22  |  1.5    Ca    8.9      10 Oct 2020 18:56  Mg     2.0     10-10    TPro  6.6  /  Alb  3.9  /  TBili  0.4  /  DBili  x   /  AST  25  /  ALT  15  /  AlkPhos  89  10-10  PT/INR - ( 10 Oct 2020 19:00 )   PT: 16.10 sec;   INR: 1.40 ratio    PTT - ( 10 Oct 2020 19:00 )  PTT:29.6 sec    RADIOLOGY & ADDITIONAL STUDIES:  EXAM:  CT BRAIN          PROCEDURE DATE:  10/10/2020    INTERPRETATION:  Clinical History / Reason for exam: Head trauma, minor.  Technique: Multiple contiguous axial CT images of the head were obtained from the base of the skull to thevertex without administration of intravenous contrast. Sagittal and coronal reformations were also obtained.  Comparison: None available at this time.    FINDINGS:  The ventricles, basal cisterns and sulcal pattern are within normal limits for patient's stated age.  There is no acute mass effect, midline shift or intracranial hemorrhage.  The grey white differentiation is maintained throughout.  No evidence of depressed skull fracture.  Partial opacification of the bilateral ethmoid sinuses.  The bilateral mastoid complexes, globes and orbits are grossly within normal limits.    IMPRESSION:  No evidence of acute intracranial pathology.  Partial opacification of the bilateral ethmoid sinuses.    CYRUS MARR M.D., RESIDENT RADIOLOGIST  This document has been electronically signed.  DORIS HENRY M.D., ATTENDING RADIOLOGIST  This document has been electronically signed. Oct 10 2020  7:11PM    < from: CT Cervical Spine No Cont (10.11.20 @ 01:44) >    EXAM:  CT CERVICAL SPINE          PROCEDURE DATE:  10/11/2020   INTERPRETATION:  CLINICAL HISTORY: Neck trauma.  Technique:Multiple contiguous axial CT images of the cervical spine were obtained from the base of the skull to C7-T1 with sagittal and coronal reformatted images without administration of intravenous contrast.  COMPARISON: None.    Findings:  There is a suspected nondisplaced fracture through the left aspect of the C2 vertebral body which appears to extend to the vertebral foramen. No evidence for surrounding soft tissue inflammation. No prevertebral soft tissue swelling. No facet subluxation.  Straightening of the usual cervical lordosis which may be on the basis of positioning, pain or muscle spasm. Trace anterolisthesis of C3 on C4 and C4 on C5. Multilevel degenerative disc disease and facet arthrosis.    Impression:  Suspect/questionable nondisplaced fracture through the left lateral aspect of the C2 vertebral body extending to the vertebral foramen. Recommend CTA neck to ensure no vascular injury.    SHONDA SAMSON M.D., RESIDENT RADIOLOGIST  This document has been electronically signed.  NANCY COFFMAN M.D., ATTENDING RADIOLOGIST  This document has been electronically signed. Oct 11 2020  4:35AM  Addend:  NANCY COFFMAN M.D., ATTENDING RADIOLOGIST  This addendum was electronically signed on: Oct 11 2020  4:46AM.    Plan:  - No acute neurosurgical intervention at this time  - F/u CT Angio Neck  - Recommend MRI neck to r/o ligamentous injury   - Will d/w attending.

## 2020-10-11 NOTE — CONSULT NOTE ADULT - SUBJECTIVE AND OBJECTIVE BOX
HPI:   70y old m  with pmhx hypertension, a fib on eliquis s/p syncope, +ht, +loc, +eliquis. The patient reports dizziness after taking benazepril (not uncommon), he stood up too quickly and had a syncopal episode. The patient reports +ht,+loc. The patient went to [facility], work up revealed a questionable C2 fracture, the patient was transferred [place] for further trauma work up and neurosurgery evaluation. In ED [place], the patient reports back pain, has an abrasion to his left forehead, otherwise no other external signs of trauma. Neurosurgery was consulted and recommended MRI, Continue [place] J, CTA of the neck. MRI was performed demonstrating previously noted C2 fracture, ligamentous injury can not be ruled out, acute fractures of T1. T2, T3 fractures, neurosurgery will review with attending. The patient will be admitted to [facility] for syncope work up, pt/rehab and cardiology/medicine consult. (11 Oct 2020 12:02)      PAST MEDICAL & SURGICAL HISTORY  Rectal cancer s/p chemotherapy and radiation therapy 4 years ago    Afib    HTN (hypertension)    H/O colonoscopy  1 YEAR  AGO rectal cancer    Stress fracture of right ankle  plate and screws      FAMILY HISTORY:  FAMILY HISTORY:      SOCIAL HISTORY:  []smoker: none  []Alcohol: occasional, none recently  []Drug: none    ALLERGIES:  No Known Allergies      MEDICATIONS:  MEDICATIONS  (STANDING):  acetaminophen   Tablet .. 650 milliGRAM(s) Oral every 6 hours  chlorhexidine 4% Liquid 1 Application(s) Topical <User Schedule>  dronedarone 400 milliGRAM(s) Oral two times a day  heparin   Injectable 5000 Unit(s) SubCutaneous every 8 hours  lisinopril 40 milliGRAM(s) Oral daily  metoprolol tartrate 50 milliGRAM(s) Oral two times a day  pantoprazole    Tablet 40 milliGRAM(s) Oral before breakfast  simvastatin 40 milliGRAM(s) Oral at bedtime    MEDICATIONS  (PRN):  oxyCODONE    IR 5 milliGRAM(s) Oral every 6 hours PRN Severe Pain (7 - 10)      HOME MEDICATIONS:  Home Medications:  benazepril 40 mg oral tablet: 1 tab(s) orally once a day (11 Oct 2020 12:02)  metoprolol tartrate 50 mg oral tablet: 1 tab(s) orally 2 times a day (11 Oct 2020 12:02)  Multaq 400 mg oral tablet: 1 tab(s) orally 2 times a day (11 Oct 2020 12:02)  simvastatin 40 mg oral tablet: 1 tab(s) orally once a day (at bedtime) (11 Oct 2020 12:02)  Xarelto 20 mg oral tablet: 1 tab(s) orally once a day (in the evening) (11 Oct 2020 12:02)      VITALS:   T(F): 97.6 (10-11 @ 15:40), Max: 99.3 (10-11 @ 12:15)  HR: 90 (10-11 @ 15:40) (65 - 100)  BP: 180/95 (10-11 @ 15:40) (104/51 - 180/95)  BP(mean): --  RR: 18 (10-11 @ 15:40) (16 - 18)  SpO2: 97% (10-11 @ 15:40) (93% - 99%)    I&O's Summary      REVIEW OF SYSTEMS:  CONSTITUTIONAL: No weakness, fevers or chills  EYES: No visual changes  ENT: No vertigo or throat pain   NECK: No pain or stiffness  RESPIRATORY: No cough, wheezing, hemoptysis; No shortness of breath  CARDIOVASCULAR: No chest pain or palpitations  GASTROINTESTINAL: No abdominal or epigastric pain. No nausea, vomiting, or hematemesis; No diarrhea or constipation. No melena or hematochezia.  GENITOURINARY: No dysuria, frequency or hematuria  NEUROLOGICAL: No numbness or weakness  SKIN: No itching, no rashes  MSK: No pain    PHYSICAL EXAM:  NEURO: patient is awake , alert and oriented  GEN: Not in acute distress  NECK: no thyroid enlargement, no JVD  LUNGS: Clear to auscultation bilaterally   CARDIOVASCULAR: S1/S2 present, RRR , no murmurs or rubs, no carotid bruits,  + PP bilaterally  ABD: Soft, non-tender, non-distended, +BS  EXT: No GALILEA  SKIN: Intact    LABS:                        11.3   6.99  )-----------( 203      ( 11 Oct 2020 06:55 )             34.6     10-10    140  |  104  |  17  ----------------------------<  137<H>  4.5   |  22  |  1.5    Ca    8.9      10 Oct 2020 18:56  Mg     2.0     10-10    TPro  6.6  /  Alb  3.9  /  TBili  0.4  /  DBili  x   /  AST  25  /  ALT  15  /  AlkPhos  89  10-10    PT/INR - ( 10 Oct 2020 19:00 )   PT: 16.10 sec;   INR: 1.40 ratio         PTT - ( 10 Oct 2020 19:00 )  PTT:29.6 sec  Troponin T, Serum: <0.01 ng/mL (10-11-20 @ 07:09)  Lactate, Blood: 1.8 mmol/L (10-11-20 @ 06:55)  Lactate, Blood: 3.5 mmol/L <H> (10-10-20 @ 22:27)  Troponin T, Serum: <0.01 ng/mL (10-10-20 @ 18:56)    CARDIAC MARKERS ( 11 Oct 2020 07:09 )  x     / <0.01 ng/mL / x     / x     / x      CARDIAC MARKERS ( 10 Oct 2020 18:56 )  x     / <0.01 ng/mL / x     / x     / x            Troponin trend:            RADIOLOGY:  -CXR:  -TTE:  -CCTA:  -STRESS TEST:  -CATHETERIZATION:    ECG:    TELEMETRY EVENTS:   HPI:    70 y.o male patient with PMH of atrial fibrillation, HTN, rectal cancer s/p chemo therapy and radiation therapy 4 years ago, who was brought in after a syncope and fall.  Patient last remembers that he was on his desk at home. He wasn't complaining of any symptoms. He was then found awake by his wife in the bathroom. As per the son, there was blood on the floor in the office but the patient was found in the bathroom upstairs. It is thought that the patient lost consciousness and fell in his office and then went upstairs to the bathroom. However, patient doesn't remember any of these events. Also as per the son, patient was coherent when talked to and was able to adequately answer their questions. It is unknown how long the episode lasted.   He was brought in to the ED at the [facility] where a cervical fracture was suspected. Following the event, patient remembers being in the ED at [facility] but nothing else.  A similar episode happened about 4-5 years ago and it was thought to be due to orthostatic hypotension as per the patient. Of note, he mentions that everytime he gets up (from bed or the chair), he feels a little dizzy and has to wait a little bit before continuing his activity.  Other than back pain, patient denies any chest pain, shortness of breath, palpitations, dizziness, nausea, vomiting, diarrhea, constipation, bleeding or any other significant symptoms.    From cardiac standpoint, patient has been following up with Dr. Pacheco for atrial fibrillation and HTN.       PAST MEDICAL & SURGICAL HISTORY  Rectal cancer s/p chemotherapy and radiation therapy 4 years ago    Afib    HTN (hypertension)    H/O colonoscopy  1 YEAR  AGO rectal cancer    Stress fracture of right ankle  plate and screws      FAMILY HISTORY:  FAMILY HISTORY:      SOCIAL HISTORY:  []smoker: none  []Alcohol: occasional, none recently  []Drug: none    ALLERGIES:  No Known Allergies      MEDICATIONS:  MEDICATIONS  (STANDING):  acetaminophen   Tablet .. 650 milliGRAM(s) Oral every 6 hours  chlorhexidine 4% Liquid 1 Application(s) Topical <User Schedule>  dronedarone 400 milliGRAM(s) Oral two times a day  heparin   Injectable 5000 Unit(s) SubCutaneous every 8 hours  lisinopril 40 milliGRAM(s) Oral daily  metoprolol tartrate 50 milliGRAM(s) Oral two times a day  pantoprazole    Tablet 40 milliGRAM(s) Oral before breakfast  simvastatin 40 milliGRAM(s) Oral at bedtime    MEDICATIONS  (PRN):  oxyCODONE    IR 5 milliGRAM(s) Oral every 6 hours PRN Severe Pain (7 - 10)      HOME MEDICATIONS:  Home Medications:  benazepril 40 mg oral tablet: 1 tab(s) orally once a day (11 Oct 2020 12:02)  metoprolol tartrate 50 mg oral tablet: 1 tab(s) orally 2 times a day (11 Oct 2020 12:02)  Multaq 400 mg oral tablet: 1 tab(s) orally 2 times a day (11 Oct 2020 12:02)  simvastatin 40 mg oral tablet: 1 tab(s) orally once a day (at bedtime) (11 Oct 2020 12:02)  Xarelto 20 mg oral tablet: 1 tab(s) orally once a day (in the evening) (11 Oct 2020 12:02)      VITALS:   T(F): 97.6 (10-11 @ 15:40), Max: 99.3 (10-11 @ 12:15)  HR: 90 (10-11 @ 15:40) (65 - 100)  BP: 180/95 (10-11 @ 15:40) (104/51 - 180/95)  BP(mean): --  RR: 18 (10-11 @ 15:40) (16 - 18)  SpO2: 97% (10-11 @ 15:40) (93% - 99%)    I&O's Summary      REVIEW OF SYSTEMS:  CONSTITUTIONAL: No weakness, fevers or chills  EYES: No visual changes  ENT: No vertigo or throat pain   NECK: Neck pain  RESPIRATORY: No cough, wheezing, hemoptysis; No shortness of breath  CARDIOVASCULAR: No chest pain or palpitations  GASTROINTESTINAL: No abdominal or epigastric pain. No nausea, vomiting, or hematemesis; No diarrhea or constipation. No melena or hematochezia.  GENITOURINARY: No dysuria, frequency or hematuria  NEUROLOGICAL: No numbness or weakness  SKIN: No itching, no rashes  MSK: No pain    PHYSICAL EXAM:  NEURO: patient is awake , alert and oriented  GEN: Not in acute distress  NECK: Collar in place  LUNGS: Clear to auscultation bilaterally   CARDIOVASCULAR: S1/S2 present, RRR, systolic murmur, 2/6 in intensity, best heard at 2nd right IC space  ABD: Soft, non-tender, non-distended, +BS  EXT: 1+ bilateral LE edema  SKIN: Intact    LABS:                        11.3   6.99  )-----------( 203      ( 11 Oct 2020 06:55 )             34.6     10-10    140  |  104  |  17  ----------------------------<  137<H>  4.5   |  22  |  1.5    Ca    8.9      10 Oct 2020 18:56  Mg     2.0     10-10    TPro  6.6  /  Alb  3.9  /  TBili  0.4  /  DBili  x   /  AST  25  /  ALT  15  /  AlkPhos  89  10-10    PT/INR - ( 10 Oct 2020 19:00 )   PT: 16.10 sec;   INR: 1.40 ratio       PTT - ( 10 Oct 2020 19:00 )  PTT:29.6 sec  Troponin T, Serum: <0.01 ng/mL (10-11-20 @ 07:09)  Lactate, Blood: 1.8 mmol/L (10-11-20 @ 06:55)  Lactate, Blood: 3.5 mmol/L <H> (10-10-20 @ 22:27)  Troponin T, Serum: <0.01 ng/mL (10-10-20 @ 18:56)    CARDIAC MARKERS ( 11 Oct 2020 07:09 )  x     / <0.01 ng/mL / x     / x     / x      CARDIAC MARKERS ( 10 Oct 2020 18:56 )  x     / <0.01 ng/mL / x     / x     / x        Troponin trend:      RADIOLOGY:  -CXR:  -TTE:  -CCTA:  -STRESS TEST:  -CATHETERIZATION:    ECG: NSR    TELEMETRY EVENTS: NSR   HPI:    70 y.o male patient with PMH of atrial fibrillation, HTN, rectal cancer s/p chemo therapy and radiation therapy 4 years ago, who was brought in after a syncope and fall.  Patient last remembers that he was on his desk at home. He wasn't complaining of any symptoms. He was then found awake by his wife in the bathroom. As per the son, there was blood on the floor in the office but the patient was found in the bathroom upstairs. It is thought that the patient lost consciousness and fell in his office and then went upstairs to the bathroom. However, patient doesn't remember any of these events. Also as per the son, patient was coherent when talked to and was able to adequately answer their questions. It is unknown how long the episode lasted.   He was brought in to the ED at the [facility] where a cervical fracture was suspected. Following the event, patient remembers being in the ED at [facility] but nothing else.  A similar episode happened about 4-5 years ago and it was thought to be due to orthostatic hypotension as per the patient. Of note, he mentions that everytime he gets up (from bed or the chair), he feels a little dizzy and has to wait a little bit before continuing his activity.  Other than back pain, patient denies any chest pain, shortness of breath, palpitations, dizziness, nausea, vomiting, diarrhea, constipation, bleeding or any other significant symptoms.    From cardiac standpoint, patient has been following up with Dr. Pacheco for atrial fibrillation and HTN.       PAST MEDICAL & SURGICAL HISTORY  Rectal cancer s/p chemotherapy and radiation therapy 4 years ago    Afib    HTN (hypertension)    H/O colonoscopy  1 YEAR  AGO rectal cancer    Stress fracture of right ankle  plate and screws      FAMILY HISTORY:  FAMILY HISTORY:      SOCIAL HISTORY:  []smoker: none  []Alcohol: occasional, none recently  []Drug: none    ALLERGIES:  No Known Allergies      MEDICATIONS:  MEDICATIONS  (STANDING):  acetaminophen   Tablet .. 650 milliGRAM(s) Oral every 6 hours  chlorhexidine 4% Liquid 1 Application(s) Topical <User Schedule>  dronedarone 400 milliGRAM(s) Oral two times a day  heparin   Injectable 5000 Unit(s) SubCutaneous every 8 hours  lisinopril 40 milliGRAM(s) Oral daily  metoprolol tartrate 50 milliGRAM(s) Oral two times a day  pantoprazole    Tablet 40 milliGRAM(s) Oral before breakfast  simvastatin 40 milliGRAM(s) Oral at bedtime    MEDICATIONS  (PRN):  oxyCODONE    IR 5 milliGRAM(s) Oral every 6 hours PRN Severe Pain (7 - 10)      HOME MEDICATIONS:  Home Medications:  benazepril 40 mg oral tablet: 1 tab(s) orally once a day (11 Oct 2020 12:02)  metoprolol tartrate 50 mg oral tablet: 1 tab(s) orally 2 times a day (11 Oct 2020 12:02)  Multaq 400 mg oral tablet: 1 tab(s) orally 2 times a day (11 Oct 2020 12:02)  simvastatin 40 mg oral tablet: 1 tab(s) orally once a day (at bedtime) (11 Oct 2020 12:02)  Xarelto 20 mg oral tablet: 1 tab(s) orally once a day (in the evening) (11 Oct 2020 12:02)      VITALS:   T(F): 97.6 (10-11 @ 15:40), Max: 99.3 (10-11 @ 12:15)  HR: 90 (10-11 @ 15:40) (65 - 100)  BP: 180/95 (10-11 @ 15:40) (104/51 - 180/95)  BP(mean): --  RR: 18 (10-11 @ 15:40) (16 - 18)  SpO2: 97% (10-11 @ 15:40) (93% - 99%)    I&O's Summary      REVIEW OF SYSTEMS:  CONSTITUTIONAL: No weakness, fevers or chills  EYES: No visual changes  ENT: No vertigo or throat pain   NECK: Neck pain  RESPIRATORY: No cough, wheezing, hemoptysis; No shortness of breath  CARDIOVASCULAR: No chest pain or palpitations  GASTROINTESTINAL: No abdominal or epigastric pain. No nausea, vomiting, or hematemesis; No diarrhea or constipation. No melena or hematochezia.  GENITOURINARY: No dysuria, frequency or hematuria  NEUROLOGICAL: No numbness or weakness  SKIN: No itching, no rashes  MSK: No pain    PHYSICAL EXAM:  NEURO: patient is awake , alert and oriented  GEN: Not in acute distress  NECK: Collar in place  LUNGS: Clear to auscultation bilaterally   CARDIOVASCULAR: S1/S2 present, RRR, systolic murmur, 2/6 in intensity, best heard at 2nd right IC space  ABD: Soft, non-tender, non-distended, +BS  EXT: 1+ bilateral LE edema  SKIN: Intact    LABS:                        11.3   6.99  )-----------( 203      ( 11 Oct 2020 06:55 )             34.6     10-10    140  |  104  |  17  ----------------------------<  137<H>  4.5   |  22  |  1.5    Ca    8.9      10 Oct 2020 18:56  Mg     2.0     10-10    TPro  6.6  /  Alb  3.9  /  TBili  0.4  /  DBili  x   /  AST  25  /  ALT  15  /  AlkPhos  89  10-10    PT/INR - ( 10 Oct 2020 19:00 )   PT: 16.10 sec;   INR: 1.40 ratio       PTT - ( 10 Oct 2020 19:00 )  PTT:29.6 sec  Troponin T, Serum: <0.01 ng/mL (10-11-20 @ 07:09)  Lactate, Blood: 1.8 mmol/L (10-11-20 @ 06:55)  Lactate, Blood: 3.5 mmol/L <H> (10-10-20 @ 22:27)  Troponin T, Serum: <0.01 ng/mL (10-10-20 @ 18:56)    CARDIAC MARKERS ( 11 Oct 2020 07:09 )  x     / <0.01 ng/mL / x     / x     / x      CARDIAC MARKERS ( 10 Oct 2020 18:56 )  x     / <0.01 ng/mL / x     / x     / x        Troponin trend:      RADIOLOGY:  -CXR:  -TTE:  -CCTA:  -STRESS TEST:  -CATHETERIZATION:      < from: MR Cervical Spine No Cont (10.11.20 @ 09:58) >  IMPRESSION:    1.  Acute fractures involving C2, T1, T2, and T3.    2.  T4; minimal compression fracture seen on the  images, age indeterminate.    3.  Abnormal signal in the dorsal paraspinal soft tissues from C2 through C4 may represent ligamentous injury.    4.  C2-C3; degenerative changes and left foraminal narrowing.    5.  C3-C4, C4-C5, C5-C6, and C6-C7; disc osteophyte complexes, degenerative changes, and bilateral foraminal narrowing.    6.  Straightening of the normal cervical lordosis may be secondary to patient positioning or muscle spasm.    7.  Minimal prevertebral soft tissue swelling.    < end of copied text >    ECG: NSR    TELEMETRY EVENTS: NSR

## 2020-10-11 NOTE — CONSULT NOTE ADULT - SUBJECTIVE AND OBJECTIVE BOX
70y m    TRAUMA ACTIVATION LEVEL:  Trauma Transfer    MECHANISM OF INJURY:      [] Blunt  	[] MVC	[x] Fall	[] Pedestrian Struck	[] Motorcycle   [] Assault   [] Bicycle collision  [] Sports injury     [] Penetrating  	[] Gun Shot Wound 		[] Stab Wound    GCS: 	E: 4	V: 5	M: 6      HPI: 70y old m  with pmhx hypertension, a fib on eliquis s/p syncope, +ht, +loc, +eliquis. The patient reports dizziness after taking benazepril (not uncommon), he stood up too quickly and had a syncopal episode. The patient reports +ht,+loc. The patient went to [facility], work up revealed a questionable C2 fracture, [place] trauma work up and neurosurgery evaluation. [place] ED [place], the patient reports back pain, has an abrasion to his left forehead, otherwise no other external signs of trauma. Neurosurgery was consulted and recommended MRI, Continue [place] J, CTA of the neck.       PAST MEDICAL & SURGICAL HISTORY:  Rectal cancer    Afib    HTN (hypertension)    H/O colonoscopy  1 YEAR  AGO rectal cancer    Stress fracture of right ankle  plate and screws        Allergies    No Known Allergies    Intolerances        Home Medications:  benazepril 10 mg oral tablet: 1 tab(s) orally once a day (27 Feb 2020 09:52)  metoprolol tartrate 50 mg oral tablet: 1 tab(s) orally 2 times a day (27 Feb 2020 09:52)  simvastatin 40 mg oral tablet: 1 tab(s) orally once a day (at bedtime) (27 Feb 2020 09:52)  Xarelto 2.5 mg oral tablet: 1 tab(s) orally 2 times a day (27 Feb 2020 09:52)      ROS: 10-system review is otherwise negative except HPI above.      Primary Survey:    A - airway intact  B - bilateral breath sounds and good chest rise  C - palpable pulses in all extremities  D - GCS 15 on arrival, FLAHERTY  Exposure obtained    Vital Signs Last 24 Hrs  T(C): 37.2 (11 Oct 2020 05:53), Max: 37.2 (11 Oct 2020 05:53)  T(F): 99 (11 Oct 2020 05:53), Max: 99 (11 Oct 2020 05:53)  HR: 100 (11 Oct 2020 05:53) (65 - 100)  BP: 177/81 (11 Oct 2020 05:53) (104/51 - 177/81)  BP(mean): --  RR: 16 (11 Oct 2020 05:53) (16 - 18)  SpO2: 99% (11 Oct 2020 05:53) (93% - 99%)    Secondary Survey:   General: NAD  HEENT: Right forehead abrasion Normocephalic, , EOMI, PEERLA. no scalp lacerations   Neck: [place] J collar in place, Soft, midline trachea. no cspine tenderness  Chest: No chest wall tenderness. or subq  emphysema   Cardiac: S1, S2, RRR  Respiratory: Bilateral breath sounds, clear and equal bilaterally  Abdomen: Soft, non-distended, non-tender, no rebound,   Groin: Normal appearing, pelvis stable   Ext: palp radial b/l UE, b/l DP palp in Lower Extrem.       FAST    Procedures:    LABS:  Labs:  CAPILLARY BLOOD GLUCOSE      POCT Blood Glucose.: 120 mg/dL (10 Oct 2020 18:40)                          12.2   13.10 )-----------( 251      ( 10 Oct 2020 18:56 )             36.4       Auto Neutrophil %: 81.2 % (10-10-20 @ 18:56)  Auto Immature Granulocyte %: 1.1 % (10-10-20 @ 18:56)    10-10    140  |  104  |  17  ----------------------------<  137<H>  4.5   |  22  |  1.5      Calcium, Total Serum: 8.9 mg/dL (10-10-20 @ 18:56)      LFTs:             6.6  | 0.4  | 25       ------------------[89      ( 10 Oct 2020 18:56 )  3.9  | x    | 15          Lipase:x      Amylase:x         Lactate, Blood: 3.5 mmol/L (10-10-20 @ 22:27)  Blood Gas Venous - Lactate: 4.5 mmoL/L (10-09-20 @ 19:13)      Coags:     16.10  ----< 1.40    ( 10 Oct 2020 19:00 )     29.6        CARDIAC MARKERS ( 10 Oct 2020 18:56 )  x     / <0.01 ng/mL / x     / x     / x          RADIOLOGY & ADDITIONAL STUDIES:  < from: CT Abdomen and Pelvis w/ IV Cont (10.11.20 @ 01:48) >  IMPRESSION:    Mild vertebral body height loss at the superior endplate of T3, age indeterminate    Otherwise no definite CT evidence for acute trauma injury to the chest, abdomen or pelvis.    < end of copied text >  < from: CT Chest w/ IV Cont (10.11.20 @ 01:46) >  IMPRESSION:    Mild vertebral body height loss at the superior endplate of T3, age indeterminate    Otherwise no definite CT evidence for acute trauma injury to the chest, abdomen or pelvis.    < end of copied text >  < from: CT Cervical Spine No Cont (10.11.20 @ 01:44) >  Impression:    Suspect/questionable nondisplaced fracture through the left lateral aspect of the C2 vertebral body extending to the vertebral foramen. Recommend CTA neck to ensure no vascular injury.      ***Please see the addendum at the top of this report. It may contain additional important information or changes.****    < end of copied text >  < from: CT Head No Cont (10.10.20 @ 18:25) >  IMPRESSION:  No evidence of acute intracranial pathology.  Partial opacification of the bilateral ethmoid sinuses.    < end of copied text >      ---------------------------------------------------------------------------------------

## 2020-10-11 NOTE — CONSULT NOTE ADULT - ATTENDING COMMENTS
No acute neurosurgical intervention.  Pt will need hard [place] collar until follow up in my office in 2 weeks.  There is no intervention for the T1, T2, T3, T4 fractures.  Pt can get physical therapy and pain meds as needed.

## 2020-10-11 NOTE — CONSULT NOTE ADULT - ATTENDING COMMENTS
I was physically present for the key portions of the evaluation and management (E/M) service provided.  I agree with the above history, physical, and plan which I have reviewed and edited where appropriate.  This was reviewed with the cardiology fellow (Dr. Roper).

## 2020-10-12 LAB
ANION GAP SERPL CALC-SCNC: 8 MMOL/L — SIGNIFICANT CHANGE UP (ref 7–14)
BASOPHILS # BLD AUTO: 0.03 K/UL — SIGNIFICANT CHANGE UP (ref 0–0.2)
BASOPHILS NFR BLD AUTO: 0.5 % — SIGNIFICANT CHANGE UP (ref 0–1)
BUN SERPL-MCNC: 15 MG/DL — SIGNIFICANT CHANGE UP (ref 10–20)
CALCIUM SERPL-MCNC: 9 MG/DL — SIGNIFICANT CHANGE UP (ref 8.5–10.1)
CHLORIDE SERPL-SCNC: 104 MMOL/L — SIGNIFICANT CHANGE UP (ref 98–110)
CK MB CFR SERPL CALC: 3 NG/ML — SIGNIFICANT CHANGE UP (ref 0.6–6.3)
CK SERPL-CCNC: 418 U/L — HIGH (ref 0–225)
CO2 SERPL-SCNC: 24 MMOL/L — SIGNIFICANT CHANGE UP (ref 17–32)
CREAT SERPL-MCNC: 1 MG/DL — SIGNIFICANT CHANGE UP (ref 0.7–1.5)
EOSINOPHIL # BLD AUTO: 0.14 K/UL — SIGNIFICANT CHANGE UP (ref 0–0.7)
EOSINOPHIL NFR BLD AUTO: 2.3 % — SIGNIFICANT CHANGE UP (ref 0–8)
GLUCOSE BLDC GLUCOMTR-MCNC: 118 MG/DL — HIGH (ref 70–99)
GLUCOSE SERPL-MCNC: 109 MG/DL — HIGH (ref 70–99)
HCT VFR BLD CALC: 34.8 % — LOW (ref 42–52)
HCV AB S/CO SERPL IA: 0.07 COI — SIGNIFICANT CHANGE UP
HCV AB SERPL-IMP: SIGNIFICANT CHANGE UP
HGB BLD-MCNC: 11.3 G/DL — LOW (ref 14–18)
IMM GRANULOCYTES NFR BLD AUTO: 0.2 % — SIGNIFICANT CHANGE UP (ref 0.1–0.3)
LYMPHOCYTES # BLD AUTO: 0.81 K/UL — LOW (ref 1.2–3.4)
LYMPHOCYTES # BLD AUTO: 13 % — LOW (ref 20.5–51.1)
MAGNESIUM SERPL-MCNC: 2 MG/DL — SIGNIFICANT CHANGE UP (ref 1.8–2.4)
MCHC RBC-ENTMCNC: 31 PG — SIGNIFICANT CHANGE UP (ref 27–31)
MCHC RBC-ENTMCNC: 32.5 G/DL — SIGNIFICANT CHANGE UP (ref 32–37)
MCV RBC AUTO: 95.3 FL — HIGH (ref 80–94)
MONOCYTES # BLD AUTO: 0.67 K/UL — HIGH (ref 0.1–0.6)
MONOCYTES NFR BLD AUTO: 10.8 % — HIGH (ref 1.7–9.3)
NEUTROPHILS # BLD AUTO: 4.55 K/UL — SIGNIFICANT CHANGE UP (ref 1.4–6.5)
NEUTROPHILS NFR BLD AUTO: 73.2 % — SIGNIFICANT CHANGE UP (ref 42.2–75.2)
NRBC # BLD: 0 /100 WBCS — SIGNIFICANT CHANGE UP (ref 0–0)
PHOSPHATE SERPL-MCNC: 3.1 MG/DL — SIGNIFICANT CHANGE UP (ref 2.1–4.9)
PLATELET # BLD AUTO: 182 K/UL — SIGNIFICANT CHANGE UP (ref 130–400)
POTASSIUM SERPL-MCNC: 4.6 MMOL/L — SIGNIFICANT CHANGE UP (ref 3.5–5)
POTASSIUM SERPL-SCNC: 4.6 MMOL/L — SIGNIFICANT CHANGE UP (ref 3.5–5)
RBC # BLD: 3.65 M/UL — LOW (ref 4.7–6.1)
RBC # FLD: 13.3 % — SIGNIFICANT CHANGE UP (ref 11.5–14.5)
SODIUM SERPL-SCNC: 136 MMOL/L — SIGNIFICANT CHANGE UP (ref 135–146)
TROPONIN T SERPL-MCNC: <0.01 NG/ML — SIGNIFICANT CHANGE UP
WBC # BLD: 6.21 K/UL — SIGNIFICANT CHANGE UP (ref 4.8–10.8)
WBC # FLD AUTO: 6.21 K/UL — SIGNIFICANT CHANGE UP (ref 4.8–10.8)

## 2020-10-12 PROCEDURE — 93306 TTE W/DOPPLER COMPLETE: CPT | Mod: 26

## 2020-10-12 PROCEDURE — 99232 SBSQ HOSP IP/OBS MODERATE 35: CPT

## 2020-10-12 PROCEDURE — 99222 1ST HOSP IP/OBS MODERATE 55: CPT

## 2020-10-12 PROCEDURE — 99223 1ST HOSP IP/OBS HIGH 75: CPT | Mod: AI

## 2020-10-12 RX ORDER — BACITRACIN ZINC 500 UNIT/G
1 OINTMENT IN PACKET (EA) TOPICAL DAILY
Refills: 0 | Status: DISCONTINUED | OUTPATIENT
Start: 2020-10-12 | End: 2020-10-14

## 2020-10-12 RX ADMIN — HEPARIN SODIUM 5000 UNIT(S): 5000 INJECTION INTRAVENOUS; SUBCUTANEOUS at 14:49

## 2020-10-12 RX ADMIN — OXYCODONE HYDROCHLORIDE 5 MILLIGRAM(S): 5 TABLET ORAL at 18:07

## 2020-10-12 RX ADMIN — HEPARIN SODIUM 5000 UNIT(S): 5000 INJECTION INTRAVENOUS; SUBCUTANEOUS at 22:01

## 2020-10-12 RX ADMIN — LISINOPRIL 40 MILLIGRAM(S): 2.5 TABLET ORAL at 05:54

## 2020-10-12 RX ADMIN — SIMVASTATIN 40 MILLIGRAM(S): 20 TABLET, FILM COATED ORAL at 22:01

## 2020-10-12 RX ADMIN — DRONEDARONE 400 MILLIGRAM(S): 400 TABLET, FILM COATED ORAL at 17:06

## 2020-10-12 RX ADMIN — Medication 50 MILLIGRAM(S): at 05:56

## 2020-10-12 RX ADMIN — OXYCODONE HYDROCHLORIDE 5 MILLIGRAM(S): 5 TABLET ORAL at 05:52

## 2020-10-12 RX ADMIN — Medication 50 MILLIGRAM(S): at 17:06

## 2020-10-12 RX ADMIN — DRONEDARONE 400 MILLIGRAM(S): 400 TABLET, FILM COATED ORAL at 05:54

## 2020-10-12 RX ADMIN — CHLORHEXIDINE GLUCONATE 1 APPLICATION(S): 213 SOLUTION TOPICAL at 05:53

## 2020-10-12 RX ADMIN — HEPARIN SODIUM 5000 UNIT(S): 5000 INJECTION INTRAVENOUS; SUBCUTANEOUS at 05:54

## 2020-10-12 RX ADMIN — OXYCODONE HYDROCHLORIDE 5 MILLIGRAM(S): 5 TABLET ORAL at 12:22

## 2020-10-12 RX ADMIN — PANTOPRAZOLE SODIUM 40 MILLIGRAM(S): 20 TABLET, DELAYED RELEASE ORAL at 06:02

## 2020-10-12 RX ADMIN — OXYCODONE HYDROCHLORIDE 5 MILLIGRAM(S): 5 TABLET ORAL at 11:38

## 2020-10-12 RX ADMIN — Medication 1 APPLICATION(S): at 11:34

## 2020-10-12 RX ADMIN — OXYCODONE HYDROCHLORIDE 5 MILLIGRAM(S): 5 TABLET ORAL at 06:52

## 2020-10-12 NOTE — CHART NOTE - NSCHARTNOTEFT_GEN_A_CORE
GENERAL SURGERY FLOOR TRANSFER NOTE    70y [id number] transferred to medicine from trauma service.    SUBJECTIVE:  70y old m  with pmhx hypertension, a fib on eliquis s/p syncope, +ht, +loc, +eliquis. The patient reports dizziness after taking benazepril (not uncommon), he stood up too quickly and had a syncopal episode. The patient reports +ht,+loc. The patient went to [facility], work up revealed a questionable C2 fracture, the patient was transferred [place] for further trauma work up and neurosurgery evaluation. In ED [place], the patient reports back pain, has an abrasion to his left forehead, otherwise no other external signs of trauma. Neurosurgery was consulted and recommended MRI, Continue denisse ASCENCIO, CTA of the neck. MRI was performed demonstrating previously noted C2 fracture, ligamentous injury can not be ruled out, acute fractures of T1. T2, T3 fractures, neurosurgery will review with attending. The patient will be admitted to [facility] for syncope work up, pt/rehab and cardiology/medicine consult.    SYNCOPE;FACIAL ABRASION;CLOSED HEAD INJURY    ^SYNCOPE;FACIAL ABRASION;CLOSED HEAD INJURY    Handoff    MEWS Score    Rectal cancer    Afib    HTN (hypertension)    C2 cervical fracture    Syncope    H/O colonoscopy    Stress fracture of right ankle    SYNCOPE;FACIAL ABRASION;CLOSED HEAD INJURY    NOSE BLEED    Syncope    Closed head injury    Facial abrasion    SysAdmin_VisitLink      No Known Allergies    acetaminophen   Tablet .. 650 milliGRAM(s) Oral every 6 hours  BACItracin   Ointment 1 Application(s) Topical daily  chlorhexidine 4% Liquid 1 Application(s) Topical <User Schedule>  dronedarone 400 milliGRAM(s) Oral two times a day  heparin   Injectable 5000 Unit(s) SubCutaneous every 8 hours  lisinopril 40 milliGRAM(s) Oral daily  metoprolol tartrate 50 milliGRAM(s) Oral two times a day  oxyCODONE    IR 5 milliGRAM(s) Oral every 6 hours PRN  pantoprazole    Tablet 40 milliGRAM(s) Oral before breakfast  simvastatin 40 milliGRAM(s) Oral at bedtime      OBJECTIVE:    T(C): 36.8 (10-12-20 @ 05:22), Max: 37.4 (10-11-20 @ 12:15)  HR: 85 (10-11-20 @ 20:55) (82 - 94)  BP: 166/74 (10-11-20 @ 20:55) (138/65 - 180/95)  RR: 18 (10-12-20 @ 05:22) (18 - 20)  SpO2: 97% (10-11-20 @ 19:18) (97% - 98%)  Wt(kg): --      I&O's Summary    11 Oct 2020 07:01  -  12 Oct 2020 07:00  --------------------------------------------------------  IN: 0 mL / OUT: 500 mL / NET: -500 mL    12 Oct 2020 07:01  -  12 Oct 2020 11:51  --------------------------------------------------------  IN: 220 mL / OUT: 300 mL / NET: -80 mL                              11.3   6.21  )-----------( 182      ( 12 Oct 2020 02:50 )             34.8       10-12    136  |  104  |  15  ----------------------------<  109<H>  4.6   |  24  |  1.0    Ca    9.0      12 Oct 2020 02:50  Phos  3.1     10-12  Mg     2.0     10-12    TPro  6.6  /  Alb  3.9  /  TBili  0.4  /  DBili  x   /  AST  25  /  ALT  15  /  AlkPhos  89  10-10      MEDICATIONS  (STANDING):  acetaminophen   Tablet .. 650 milliGRAM(s) Oral every 6 hours  BACItracin   Ointment 1 Application(s) Topical daily  chlorhexidine 4% Liquid 1 Application(s) Topical <User Schedule>  dronedarone 400 milliGRAM(s) Oral two times a day  heparin   Injectable 5000 Unit(s) SubCutaneous every 8 hours  lisinopril 40 milliGRAM(s) Oral daily  metoprolol tartrate 50 milliGRAM(s) Oral two times a day  pantoprazole    Tablet 40 milliGRAM(s) Oral before breakfast  simvastatin 40 milliGRAM(s) Oral at bedtime    MEDICATIONS  (PRN):  oxyCODONE    IR 5 milliGRAM(s) Oral every 6 hours PRN Severe Pain (7 - 10)        Troponin T, Serum: 20:00 (10-11-20 @ 13:08)  CKMB Units: 20:00 (10-11-20 @ 13:08)  Creatine Kinase, Serum: 20:00 (10-11-20 @ 13:08)  Phosphorus Level, Serum: 20:00 (10-11-20 @ 12:44)  Magnesium, Serum: 20:00 (10-11-20 @ 12:44)  Complete Blood Count + Automated Diff: 20:00 (10-11-20 @ 12:44)  Basic Metabolic Panel: 20:00 (10-11-20 @ 12:44)  COVID-19  Antibody - for prior infection screening: Routine (10-11-20 @ 12:16)    70y old m s/p syncope and fall, +ht, +loc, +eliquis (a fib), transferred from [facility] for neurosurgery and trauma evaluation for C2 fracture, CTA of neck performed, no vascular injury. MRI obtained shows C2 fracture, acute T1, T2, T3 fractures. The patient will be admitted for syncope work up, neurosurgery evaluation and PT/Rehab    -Follow up EPS  -Follow up EEG  -Follow up Neurology consult  -Cardiology cs done, note in chart  -Neurosurgery recommends non-surgical management, follow up with Dr. Velasquez outpatient in 2 weeks, see note  -On regular diet, IVL  -Continue home medications    Approved for medical transfer by Dr. Oswald [id number]  Signed out to medical resident Dr. Tiwari at [id number] on 10/12/20 at 1154am

## 2020-10-12 NOTE — CONSULT NOTE ADULT - SUBJECTIVE AND OBJECTIVE BOX
HPI:   70y old m  with pmhx hypertension, a fib on eliquis s/p syncope, +ht, +loc, +eliquis. The patient reports dizziness after taking benazepril (not uncommon), he stood up too quickly and had a syncopal episode. The patient reports +ht,+loc. The patient went to [facility], work up revealed a questionable C2 fracture, the patient was transferred [place] for further trauma work up and neurosurgery evaluation. In ED [place], the patient reports back pain, has an abrasion to his left forehead, otherwise no other external signs of trauma. Neurosurgery was consulted and recommended MRI, Continue denisse ASCENCIO, CTA of the neck. MRI was performed demonstrating previously noted C2 fracture, ligamentous injury can not be ruled out, acute fractures of T1. T2, T3 fractures, neurosurgery will review with attending. The patient will be admitted to [facility] for syncope work up, pt/rehab and cardiology/medicine consult. (11 Oct 2020 12:02)      PAST MEDICAL & SURGICAL HISTORY:  Rectal cancer    Afib    HTN (hypertension)    H/O colonoscopy  1 YEAR  AGO rectal cancer    Stress fracture of right ankle  plate and screws        Hospital Course:    TODAY'S SUBJECTIVE & REVIEW OF SYMPTOMS:     Constitutional WNL   Cardio WNL   Resp WNL   GI WNL  Heme WNL  Endo WNL  Skin WNL  MSK neck pain  Neuro WNL  Cognitive WNL  Psych WNL      MEDICATIONS  (STANDING):  acetaminophen   Tablet .. 650 milliGRAM(s) Oral every 6 hours  BACItracin   Ointment 1 Application(s) Topical daily  chlorhexidine 4% Liquid 1 Application(s) Topical <User Schedule>  dronedarone 400 milliGRAM(s) Oral two times a day  heparin   Injectable 5000 Unit(s) SubCutaneous every 8 hours  lisinopril 40 milliGRAM(s) Oral daily  metoprolol tartrate 50 milliGRAM(s) Oral two times a day  pantoprazole    Tablet 40 milliGRAM(s) Oral before breakfast  simvastatin 40 milliGRAM(s) Oral at bedtime    MEDICATIONS  (PRN):  oxyCODONE    IR 5 milliGRAM(s) Oral every 6 hours PRN Severe Pain (7 - 10)      FAMILY HISTORY:      Allergies    No Known Allergies    Intolerances        SOCIAL HISTORY:    [  ] Etoh  [  ] Smoking  [  ] Substance abuse     Home Environment:  [  ] Home Alone  [x  ] Lives with Family  [  ] Home Health Aid    Dwelling:  [  ] Apartment  [x  ] Private House  [  ] Adult Home  [  ] Skilled Nursing Facility      [  ] Short Term  [  ] Long Term  [ x ] Stairs       Elevator [  ]    FUNCTIONAL STATUS PTA: (Check all that apply)  Ambulation: [ x  ]Independent    [  ] Dependent     [  ] Non-Ambulatory  Assistive Device: [  ] SA Cane  [  ]  Q Cane  [  ] Walker  [  ]  Wheelchair  ADL : [x  ] Independent  [  ]  Dependent       Vital Signs Last 24 Hrs  T(C): 36.8 (12 Oct 2020 13:07), Max: 37.3 (11 Oct 2020 19:18)  T(F): 98.2 (12 Oct 2020 13:07), Max: 99.2 (11 Oct 2020 19:18)  HR: 78 (12 Oct 2020 13:07) (78 - 90)  BP: 135/65 (12 Oct 2020 13:07) (135/65 - 180/95)  BP(mean): --  RR: 18 (12 Oct 2020 13:07) (18 - 20)  SpO2: 97% (11 Oct 2020 19:18) (97% - 97%)      PHYSICAL EXAM: Awake & Alert  GENERAL: NAD  HEAD:  Normocephalic, left periorbital ecchymosis  CHEST/LUNG: Clear   HEART: S1S2+  ABDOMEN: Soft, Nontender  EXTREMITIES:  no calf tenderness    NERVOUS SYSTEM:  Cranial Nerves 2-12 intact [  ] Abnormal  [  ]  ROM: WFL all extremities [x  ]  Abnormal [  ]  Motor Strength: WFL all extremities  [ xx ]  Abnormal [  ]  Sensation: intact to light touch [ x ] Abnormal [  ]    FUNCTIONAL STATUS:  Bed Mobility: Independent [  ]  Supervision [  ]  Needs Assistance [x  ]  N/A [  ]  Transfers: Independent [  ]  Supervision [  ]  Needs Assistance [ x ]  N/A [  ]   Ambulation: Independent [  ]  Supervision [  ]  Needs Assistance [  ]  N/A [  ]  ADL: Independent [  ] Requires Assistance [  ] N/A [  ]      LABS:                        11.3   6.21  )-----------( 182      ( 12 Oct 2020 02:50 )             34.8     10-12    136  |  104  |  15  ----------------------------<  109<H>  4.6   |  24  |  1.0    Ca    9.0      12 Oct 2020 02:50  Phos  3.1     10-12  Mg     2.0     10-12    TPro  6.6  /  Alb  3.9  /  TBili  0.4  /  DBili  x   /  AST  25  /  ALT  15  /  AlkPhos  89  10-10    PT/INR - ( 10 Oct 2020 19:00 )   PT: 16.10 sec;   INR: 1.40 ratio         PTT - ( 10 Oct 2020 19:00 )  PTT:29.6 sec  Urinalysis Basic - ( 11 Oct 2020 04:13 )    Color: Light Yellow / Appearance: Clear / S.033 / pH: x  Gluc: x / Ketone: Negative  / Bili: Negative / Urobili: <2 mg/dL   Blood: x / Protein: Trace / Nitrite: Negative   Leuk Esterase: Negative / RBC: x / WBC x   Sq Epi: x / Non Sq Epi: x / Bacteria: x        RADIOLOGY & ADDITIONAL STUDIES:

## 2020-10-12 NOTE — PHYSICAL THERAPY INITIAL EVALUATION ADULT - GENERAL OBSERVATIONS, REHAB EVAL
13:15-14:00 Pt encountered sitting in b/s chair with tele, IV lock, [place] J collar son @ b/s, in NAD. BP: 133/65, HR: 78bpm.. Pt with c/o back, right hip/leg soreness 4/10 on VAS. Pt left same as found with tele, IV lock, [place] J collar, son @ b/s, in NAD.

## 2020-10-12 NOTE — PHYSICAL THERAPY INITIAL EVALUATION ADULT - PERTINENT HX OF CURRENT PROBLEM, REHAB EVAL
Pt ADM s/p fall. Patient reports dizziness after taking benazepril, he stood up too quickly and had a syncopal episode. The patient reports +ht,+loc. The patient went to [facility], work up revealed a questionable C2 fracture, the patient was [place] for further trauma work up and neurosurgery evaluation. In ED [place], the patient reports back pain, has an abrasion to his left forehead, otherwise no other external signs of trauma.

## 2020-10-12 NOTE — PHYSICAL THERAPY INITIAL EVALUATION ADULT - GAIT TRAINING, PT EVAL
Pt will amb 150'x1 Mod I with least restrictive AD by D/C.      Pt will negotiate 1 flight of stairs Mod I with 1 HR by d/c

## 2020-10-12 NOTE — PROGRESS NOTE ADULT - SUBJECTIVE AND OBJECTIVE BOX
GENERAL SURGERY PROGRESS NOTE     GET DURAN  70y  Male  Hospital day :1d  POD:  Procedure:   OVERNIGHT EVENTS: no acute overnight events     T(F): 98.3 (10-12-20 @ 05:22), Max: 99.3 (10-11-20 @ 12:15)  HR: 85 (10-11-20 @ 20:55) (82 - 94)  BP: 166/74 (10-11-20 @ 20:55) (138/65 - 180/95)  RR: 18 (10-12-20 @ 05:22) (18 - 20)  SpO2: 97% (10-11-20 @ 19:18) (97% - 98%)    DIET/FLUIDS:   NG:                                                                                GI proph:  pantoprazole    Tablet 40 milliGRAM(s) Oral before breakfast    AC/ proph: heparin   Injectable 5000 Unit(s) SubCutaneous every 8 hours    ABx:     PHYSICAL EXAM:  GENERAL: NAD, well-appearing  CHEST/LUNG: Clear to auscultation bilaterally  HEART: Regular rate and rhythm  ABDOMEN: Soft, Nontender, Nondistended;   EXTREMITIES:  No clubbing, cyanosis, or edema      LABS  Labs:  CAPILLARY BLOOD GLUCOSE      POCT Blood Glucose.: 124 mg/dL (11 Oct 2020 21:18)                          11.3   6.21  )-----------( 182      ( 12 Oct 2020 02:50 )             34.8       Auto Neutrophil %: 73.2 % (10-12-20 @ 02:50)  Auto Immature Granulocyte %: 0.2 % (10-12-20 @ 02:50)    10-12    136  |  104  |  15  ----------------------------<  109<H>  4.6   |  24  |  1.0      Calcium, Total Serum: 9.0 mg/dL (10-12-20 @ 02:50)      LFTs:             6.6  | 0.4  | 25       ------------------[89      ( 10 Oct 2020 18:56 )  3.9  | x    | 15          Lipase:x      Amylase:x         Lactate, Blood: 1.8 mmol/L (10-11-20 @ 06:55)  Lactate, Blood: 3.5 mmol/L (10-10-20 @ 22:27)  Blood Gas Venous - Lactate: 4.5 mmoL/L (10-09-20 @ 19:13)      Coags:     16.10  ----< 1.40    ( 10 Oct 2020 19:00 )     29.6        CARDIAC MARKERS ( 12 Oct 2020 02:50 )  x     / <0.01 ng/mL / 418 U/L / x     / 3.0 ng/mL  CARDIAC MARKERS ( 11 Oct 2020 07:09 )  x     / <0.01 ng/mL / x     / x     / x      CARDIAC MARKERS ( 10 Oct 2020 18:56 )  x     / <0.01 ng/mL / x     / x     / x              Urinalysis Basic - ( 11 Oct 2020 04:13 )    Color: Light Yellow / Appearance: Clear / S.033 / pH: x  Gluc: x / Ketone: Negative  / Bili: Negative / Urobili: <2 mg/dL   Blood: x / Protein: Trace / Nitrite: Negative   Leuk Esterase: Negative / RBC: x / WBC x   Sq Epi: x / Non Sq Epi: x / Bacteria: x            RADIOLOGY & ADDITIONAL TESTS:      A/P

## 2020-10-12 NOTE — CONSULT NOTE ADULT - SUBJECTIVE AND OBJECTIVE BOX
GET DURAN     70y     Male    MRN-[id number]                                                           CC:Patient is a 70y old  Male who presents with a chief complaint of s/p syncope and fall, +ht, +loc, +xarelto with C2, T1, T2, T3 fracture (12 Oct 2020 09:41)      HPI:   70y old m  with pmhx hypertension, a fib on eliquis s/p syncope, +ht, +loc, +eliquis. The patient reports dizziness after taking benazepril (not uncommon), he stood up too quickly and had a syncopal episode. The patient reports +ht,+loc. The patient went to [facility], work up revealed a questionable C2 fracture, the patient was transferred north for further trauma work up and neurosurgery evaluation. In ED north, the patient reports back pain, has an abrasion to his left forehead, otherwise no other external signs of trauma. Neurosurgery was consulted and recommended MRI, Continue [place] J, CTA of the neck. MRI was performed demonstrating previously noted C2 fracture, ligamentous injury can not be ruled out, acute fractures of T1. T2, T3 fractures, neurosurgery will review with attending. The patient will be admitted to [facility] for syncope work up, pt/rehab and cardiology/medicine consult. (11 Oct 2020 12:02)    Patient seen and examined and reviewed history with patient and through notes in EMR.  Reviewed imaging, labs, vitals and meds.  Patient gets episodes frequently when standing up of feeling lightheaded and will need to sit for a little bit longer before walking.  This episode he got up and then passed out.  Had one similar peisode 3 years ago after he received chemo when getting out of a car at his [facility].    ROS:  Constitutional, Neurological, Psychiatric, Eyes, ENT, Cardiovascular, Respiratory, Gastrointestinal, Genitourinary, Musculoskeletal, Integumentary, Endocrine and Heme/Lymph are otherwise negative. Except for anove    Social History: NoO smoking, No drinking, No drug use    FAMILY HISTORY: non contributory              Vital Signs Last 24 Hrs  T(C): 36.8 (12 Oct 2020 05:22), Max: 37.3 (11 Oct 2020 19:18)  T(F): 98.3 (12 Oct 2020 05:22), Max: 99.2 (11 Oct 2020 19:18)  HR: 85 (11 Oct 2020 20:55) (82 - 90)  BP: 166/74 (11 Oct 2020 20:55) (138/65 - 180/95)  BP(mean): --  RR: 18 (12 Oct 2020 05:22) (18 - 20)  SpO2: 97% (11 Oct 2020 19:18) (97% - 97%)    Physical Exam:  Constitutional: alert and in no acute distress.  Eyes: the sclera and conjunctiva were normal, pupils were equal in size, round, reactive to light, with normal accommodation and extraocular movements were intact.   Back: no costovertebral angle tenderness and no spinal tenderness.      Neuro Exam:  Orientation: oriented to person, oriented to place and oriented to time.   Attention: normal concentrating ability and visual attention was not decreased.   Language: no difficulty naming common objects, no difficulty repeating a phrase, no difficulty writing a sentence, fluency intact, comprehension intact and reading intact.   Fund of knowledge: displays adequate knowledge of personal past history.   Cranial Nerves: EOMI, no facial tongue midline, bruising seen predominantly over the left face, Facial sensation symmetric, VFF  Motor: No drift power 5/5  Sensory: Symmetric to Temp, Vib, decreased LT and Vib in Feet symmetrically  FTN NL  Gait deferred    NIHSS: N/A    Allergies    No Known Allergies    Intolerances       Home Medications:  benazepril 40 mg oral tablet: 1 tab(s) orally once a day (11 Oct 2020 12:02)  metoprolol tartrate 50 mg oral tablet: 1 tab(s) orally 2 times a day (11 Oct 2020 12:02)  Multaq 400 mg oral tablet: 1 tab(s) orally 2 times a day (11 Oct 2020 12:02)  simvastatin 40 mg oral tablet: 1 tab(s) orally once a day (at bedtime) (11 Oct 2020 12:02)  Xarelto 20 mg oral tablet: 1 tab(s) orally once a day (in the evening) (11 Oct 2020 12:02)      MEDICATIONS  (STANDING):  acetaminophen   Tablet .. 650 milliGRAM(s) Oral every 6 hours  BACItracin   Ointment 1 Application(s) Topical daily  chlorhexidine 4% Liquid 1 Application(s) Topical <User Schedule>  dronedarone 400 milliGRAM(s) Oral two times a day  heparin   Injectable 5000 Unit(s) SubCutaneous every 8 hours  lisinopril 40 milliGRAM(s) Oral daily  metoprolol tartrate 50 milliGRAM(s) Oral two times a day  pantoprazole    Tablet 40 milliGRAM(s) Oral before breakfast  simvastatin 40 milliGRAM(s) Oral at bedtime    MEDICATIONS  (PRN):  oxyCODONE    IR 5 milliGRAM(s) Oral every 6 hours PRN Severe Pain (7 - 10)      LABS:                        11.3   6.21  )-----------( 182      ( 12 Oct 2020 02:50 )             34.8     10-12    136  |  104  |  15  ----------------------------<  109<H>  4.6   |  24  |  1.0    Ca    9.0      12 Oct 2020 02:50  Phos  3.1     10-12  Mg     2.0     10-12    TPro  6.6  /  Alb  3.9  /  TBili  0.4  /  DBili  x   /  AST  25  /  ALT  15  /  AlkPhos  89  10-10    PT/INR - ( 10 Oct 2020 19:00 )   PT: 16.10 sec;   INR: 1.40 ratio         PTT - ( 10 Oct 2020 19:00 )  PTT:29.6 sec        Neuro Imaging:  NCHCT: < from: CT Head No Cont (10.10.20 @ 18:25) >  PROCEDURE DATE:  10/10/2020            INTERPRETATION:  Clinical History / Reason for exam: Head trauma, minor.    Technique: Multiple contiguous axial CT images of the head were obtained from the base of the skull to thevertex without administration of intravenous contrast. Sagittal and coronal reformations were also obtained.    Comparison: None available at this time.    FINDINGS:    The ventricles, basal cisterns and sulcal pattern are within normal limits for patient's stated age.    There is no acute mass effect, midline shift or intracranial hemorrhage.    The grey white differentiation is maintained throughout.    No evidence of depressed skull fracture.    Partial opacification of the bilateral ethmoid sinuses.    The bilateral mastoid complexes, globes and orbits are grossly within normal limits.    IMPRESSION:  No evidence of acute intracranial pathology.  Partial opacification of the bilateral ethmoid sinuses.    < end of copied text >      < from: MR Cervical Spine No Cont (10.11.20 @ 09:58) >    IMPRESSION:    1.  Acute fractures involving C2, T1, T2, and T3.    2.  T4; minimal compression fracture seen on the  images, age indeterminate.    3.  Abnormal signal in the dorsal paraspinal soft tissues from C2 through C4 may represent ligamentous injury.    4.  C2-C3; degenerative changes and left foraminal narrowing.    5.  C3-C4, C4-C5, C5-C6, and C6-C7; disc osteophyte complexes, degenerative changes, and bilateral foraminal narrowing.    6.  Straightening of the normal cervical lordosis may be secondary to patient positioning or muscle spasm.    7.  Minimal prevertebral soft tissue swelling.    Spoke with DEWAYNE MORSE MD on 10/11/2020 11:16 AM with readback.    < end of copied text >          Assessment / Plan: This is a 70y year old Male presenting with syncope and fall with trauma.  Given his history of lightheadedness frequently when getting up most likely this episode was an orthostatic hypotensive episode with syncope.  NO clear indication of a seizure  1. No Routine EEG indicated  2. NO further neurological workup for syncopal episode  3. Please recall PRN

## 2020-10-12 NOTE — CONSULT NOTE ADULT - SUBJECTIVE AND OBJECTIVE BOX
Admit Date: 10-11-20 (1d)    Chief Complaint:  Patient is a 70y old  Male who presents with a chief complaint of s/p syncope and fall, +ht, +loc, +xarelto with C2, T1, T2, T3 fracture (11 Oct 2020 15:42)      Past Medical and Surgical History:  PAST MEDICAL & SURGICAL HISTORY:  Rectal cancer    Afib    HTN (hypertension)    H/O colonoscopy  1 YEAR  AGO rectal cancer    Stress fracture of right ankle  plate and screws        Current Medications:  MEDICATIONS  (STANDING):  acetaminophen   Tablet .. 650 milliGRAM(s) Oral every 6 hours  BACItracin   Ointment 1 Application(s) Topical daily  chlorhexidine 4% Liquid 1 Application(s) Topical <User Schedule>  dronedarone 400 milliGRAM(s) Oral two times a day  heparin   Injectable 5000 Unit(s) SubCutaneous every 8 hours  lisinopril 40 milliGRAM(s) Oral daily  metoprolol tartrate 50 milliGRAM(s) Oral two times a day  pantoprazole    Tablet 40 milliGRAM(s) Oral before breakfast  simvastatin 40 milliGRAM(s) Oral at bedtime    MEDICATIONS  (PRN):  oxyCODONE    IR 5 milliGRAM(s) Oral every 6 hours PRN Severe Pain (7 - 10)      Interval History:  No acute events overnight.   Vital Signs:  T(F): 98.3 (10-12-20 @ 05:22), Max: 99.3 (10-11-20 @ 12:15)  HR: 85 (10-11-20 @ 20:55) (65 - 100)  BP: 166/74 (10-11-20 @ 20:55) (104/51 - 180/95)  RR: 18 (10-12-20 @ 05:22) (16 - 20)  SpO2: 97% (10-11-20 @ 19:18) (93% - 99%)  CAPILLARY BLOOD GLUCOSE      POCT Blood Glucose.: 124 mg/dL (11 Oct 2020 21:18)      Physical Exam:  General: Not in distress.   HEENT: Moist mucus membranes. PERRLA.  Cardio: Regular rate and rhythm, S1, S2, no murmur, rub, or gallop.  Pulm: Clear to auscultation bilaterally. No wheezing, rales, or rhonchi.  Abdomen: Soft, non-tender, non-distended. Normoactive bowel sounds.  Extremities: No cyanosis or edema bilaterally. No calf tenderness to palpation.  Neuro: A&O x3.     Labs and Imaging:  CBC Full  -  ( 12 Oct 2020 02:50 )  WBC Count : 6.21 K/uL  RBC Count : 3.65 M/uL  Hemoglobin : 11.3 g/dL  Hematocrit : 34.8 %  Platelet Count - Automated : 182 K/uL  Mean Cell Volume : 95.3 fL  Mean Cell Hemoglobin : 31.0 pg  Mean Cell Hemoglobin Concentration : 32.5 g/dL  Auto Neutrophil # : 4.55 K/uL  Auto Lymphocyte # : 0.81 K/uL  Auto Monocyte # : 0.67 K/uL  Auto Eosinophil # : 0.14 K/uL  Auto Basophil # : 0.03 K/uL  Auto Neutrophil % : 73.2 %  Auto Lymphocyte % : 13.0 %  Auto Monocyte % : 10.8 %  Auto Eosinophil % : 2.3 %  Auto Basophil % : 0.5 %    RDW: 13.3    PT/INR/PTT: 10-10-20 @ 19:00  16.10 | 29.6        ^      1.40    BMP: 10-12-20 @ 02:50  136 | 104 | 15   -----------------< 109  4.6  | 24 | 1.0  eGFR(AA): 88, eGFR (non-AA): 76  Ca 9.0, Mg 2.0, P 3.1    LFTs: 10-10-20 @ 18:56  TP  6.6  | 3.9 Alb   ---------------  TB  0.4  | --  DB   ---------------  ALT 15  | 25  AST            ^          89  ALK      LFTs: 10-12-20 @ 02:50  Ca  9.0  | -- AST   -----------------  TP  --  | -- ALT  -----------------  Alb --  | -- ALK          ^        --         TB      Cardiac Enzymes:  Creatine Kinase, Serum: 418 U/L <H> (10-12-20 @ 02:50)    Urinalysis:  Urinalysis Basic - ( 11 Oct 2020 04:13 )    Color: Light Yellow / Appearance: Clear / S.033 / pH: x  Gluc: x / Ketone: Negative  / Bili: Negative / Urobili: <2 mg/dL   Blood: x / Protein: Trace / Nitrite: Negative   Leuk Esterase: Negative / RBC: x / WBC x   Sq Epi: x / Non Sq Epi: x / Bacteria: x      Cultures:      Home Medications:  Home Medications:  benazepril 40 mg oral tablet: 1 tab(s) orally once a day (11 Oct 2020 12:02)  metoprolol tartrate 50 mg oral tablet: 1 tab(s) orally 2 times a day (11 Oct 2020 12:02)  Multaq 400 mg oral tablet: 1 tab(s) orally 2 times a day (11 Oct 2020 12:02)  simvastatin 40 mg oral tablet: 1 tab(s) orally once a day (at bedtime) (11 Oct 2020 12:02)  Xarelto 20 mg oral tablet: 1 tab(s) orally once a day (in the evening) (11 Oct 2020 12:02)
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
## 2020-10-12 NOTE — CONSULT NOTE ADULT - ASSESSMENT
IMPRESSION: Rehab of C2, T1-3 fx    PRECAUTIONS: [  ] Cardiac  [  ] Respiratory  [  ] Seizures [  ] Contact Isolation  [  ] Droplet Isolation  [  ] Other    Weight Bearing Status:     RECOMMENDATION:    Out of Bed to Chair     DVT/Decubiti Prophylaxis    REHAB PLAN:     [ x  ] Bedside P/T 3-5 times a week   [   ]   Bedside O/T  2-3 times a week             [   ] No Rehab Therapy Indicated                   [   ]  Speech Therapy   Conditioning/ROM                                    ADL  Bed Mobility                                               Conditioning/ROM  Transfers                                                     Bed Mobility  Sitting /Standing Balance                         Transfers                                        Gait Training                                               Sitting/Standing Balance  Stair Training [   ]Applicable                    Home equipment Eval                                                                        Splinting  [   ] Only      GOALS:   ADL   [   ]   Independent                    Transfers  [ x  ] Independent                          Ambulation  [ x  ] Independent     [  x  ] With device                            [   ]  CG                                                         [   ]  CG                                                                  [   ] CG                            [    ] Min A                                                   [   ] Min A                                                              [   ] Min  A          DISCHARGE PLAN:   [   ]  Good candidate for Intensive Rehabilitation/Hospital based                                             Will tolerate 3hrs Intensive Rehab Daily                                       [    ]  Short Term Rehab in Skilled Nursing Facility                                       [ x   ]  Home with Outpatient or  services                                         [    ]  Possible Candidate for Intensive Hospital based Rehab

## 2020-10-13 ENCOUNTER — TRANSCRIPTION ENCOUNTER (OUTPATIENT)
Age: 70
End: 2020-10-13

## 2020-10-13 LAB
ALBUMIN SERPL ELPH-MCNC: 3.5 G/DL — SIGNIFICANT CHANGE UP (ref 3.5–5.2)
ALP SERPL-CCNC: 73 U/L — SIGNIFICANT CHANGE UP (ref 30–115)
ALT FLD-CCNC: 13 U/L — SIGNIFICANT CHANGE UP (ref 0–41)
ANION GAP SERPL CALC-SCNC: 12 MMOL/L — SIGNIFICANT CHANGE UP (ref 7–14)
AST SERPL-CCNC: 17 U/L — SIGNIFICANT CHANGE UP (ref 0–41)
BILIRUB SERPL-MCNC: 0.7 MG/DL — SIGNIFICANT CHANGE UP (ref 0.2–1.2)
BUN SERPL-MCNC: 20 MG/DL — SIGNIFICANT CHANGE UP (ref 10–20)
CALCIUM SERPL-MCNC: 8.6 MG/DL — SIGNIFICANT CHANGE UP (ref 8.5–10.1)
CHLORIDE SERPL-SCNC: 99 MMOL/L — SIGNIFICANT CHANGE UP (ref 98–110)
CO2 SERPL-SCNC: 24 MMOL/L — SIGNIFICANT CHANGE UP (ref 17–32)
CREAT SERPL-MCNC: 1.1 MG/DL — SIGNIFICANT CHANGE UP (ref 0.7–1.5)
GLUCOSE SERPL-MCNC: 91 MG/DL — SIGNIFICANT CHANGE UP (ref 70–99)
HCT VFR BLD CALC: 33 % — LOW (ref 42–52)
HGB BLD-MCNC: 10.5 G/DL — LOW (ref 14–18)
MAGNESIUM SERPL-MCNC: 1.9 MG/DL — SIGNIFICANT CHANGE UP (ref 1.8–2.4)
MCHC RBC-ENTMCNC: 30.4 PG — SIGNIFICANT CHANGE UP (ref 27–31)
MCHC RBC-ENTMCNC: 31.8 G/DL — LOW (ref 32–37)
MCV RBC AUTO: 95.7 FL — HIGH (ref 80–94)
NRBC # BLD: 0 /100 WBCS — SIGNIFICANT CHANGE UP (ref 0–0)
PLATELET # BLD AUTO: 172 K/UL — SIGNIFICANT CHANGE UP (ref 130–400)
POTASSIUM SERPL-MCNC: 4.4 MMOL/L — SIGNIFICANT CHANGE UP (ref 3.5–5)
POTASSIUM SERPL-SCNC: 4.4 MMOL/L — SIGNIFICANT CHANGE UP (ref 3.5–5)
PROT SERPL-MCNC: 5.8 G/DL — LOW (ref 6–8)
RBC # BLD: 3.45 M/UL — LOW (ref 4.7–6.1)
RBC # FLD: 13.4 % — SIGNIFICANT CHANGE UP (ref 11.5–14.5)
SODIUM SERPL-SCNC: 135 MMOL/L — SIGNIFICANT CHANGE UP (ref 135–146)
WBC # BLD: 5.26 K/UL — SIGNIFICANT CHANGE UP (ref 4.8–10.8)
WBC # FLD AUTO: 5.26 K/UL — SIGNIFICANT CHANGE UP (ref 4.8–10.8)

## 2020-10-13 PROCEDURE — 99222 1ST HOSP IP/OBS MODERATE 55: CPT | Mod: 57

## 2020-10-13 PROCEDURE — 99233 SBSQ HOSP IP/OBS HIGH 50: CPT

## 2020-10-13 PROCEDURE — 93010 ELECTROCARDIOGRAM REPORT: CPT

## 2020-10-13 PROCEDURE — 33285 INSJ SUBQ CAR RHYTHM MNTR: CPT

## 2020-10-13 PROCEDURE — 99221 1ST HOSP IP/OBS SF/LOW 40: CPT | Mod: 25

## 2020-10-13 RX ORDER — CEPHALEXIN 500 MG
500 CAPSULE ORAL ONCE
Refills: 0 | Status: COMPLETED | OUTPATIENT
Start: 2020-10-13 | End: 2020-10-13

## 2020-10-13 RX ORDER — SENNA PLUS 8.6 MG/1
2 TABLET ORAL AT BEDTIME
Refills: 0 | Status: DISCONTINUED | OUTPATIENT
Start: 2020-10-13 | End: 2020-10-14

## 2020-10-13 RX ADMIN — Medication 650 MILLIGRAM(S): at 00:20

## 2020-10-13 RX ADMIN — Medication 650 MILLIGRAM(S): at 05:50

## 2020-10-13 RX ADMIN — SIMVASTATIN 40 MILLIGRAM(S): 20 TABLET, FILM COATED ORAL at 22:47

## 2020-10-13 RX ADMIN — HEPARIN SODIUM 5000 UNIT(S): 5000 INJECTION INTRAVENOUS; SUBCUTANEOUS at 22:47

## 2020-10-13 RX ADMIN — OXYCODONE HYDROCHLORIDE 5 MILLIGRAM(S): 5 TABLET ORAL at 21:40

## 2020-10-13 RX ADMIN — HEPARIN SODIUM 5000 UNIT(S): 5000 INJECTION INTRAVENOUS; SUBCUTANEOUS at 13:20

## 2020-10-13 RX ADMIN — Medication 650 MILLIGRAM(S): at 11:15

## 2020-10-13 RX ADMIN — Medication 500 MILLIGRAM(S): at 18:29

## 2020-10-13 RX ADMIN — Medication 650 MILLIGRAM(S): at 17:23

## 2020-10-13 RX ADMIN — PANTOPRAZOLE SODIUM 40 MILLIGRAM(S): 20 TABLET, DELAYED RELEASE ORAL at 06:44

## 2020-10-13 RX ADMIN — HEPARIN SODIUM 5000 UNIT(S): 5000 INJECTION INTRAVENOUS; SUBCUTANEOUS at 05:50

## 2020-10-13 RX ADMIN — Medication 50 MILLIGRAM(S): at 05:50

## 2020-10-13 RX ADMIN — Medication 650 MILLIGRAM(S): at 12:37

## 2020-10-13 RX ADMIN — OXYCODONE HYDROCHLORIDE 5 MILLIGRAM(S): 5 TABLET ORAL at 21:01

## 2020-10-13 RX ADMIN — SENNA PLUS 2 TABLET(S): 8.6 TABLET ORAL at 22:47

## 2020-10-13 RX ADMIN — LISINOPRIL 40 MILLIGRAM(S): 2.5 TABLET ORAL at 05:50

## 2020-10-13 RX ADMIN — Medication 650 MILLIGRAM(S): at 17:30

## 2020-10-13 RX ADMIN — DRONEDARONE 400 MILLIGRAM(S): 400 TABLET, FILM COATED ORAL at 05:50

## 2020-10-13 RX ADMIN — Medication 650 MILLIGRAM(S): at 00:30

## 2020-10-13 RX ADMIN — Medication 1 APPLICATION(S): at 11:16

## 2020-10-13 RX ADMIN — OXYCODONE HYDROCHLORIDE 5 MILLIGRAM(S): 5 TABLET ORAL at 04:26

## 2020-10-13 RX ADMIN — Medication 50 MILLIGRAM(S): at 17:23

## 2020-10-13 RX ADMIN — DRONEDARONE 400 MILLIGRAM(S): 400 TABLET, FILM COATED ORAL at 17:23

## 2020-10-13 RX ADMIN — Medication 650 MILLIGRAM(S): at 06:44

## 2020-10-13 NOTE — PROGRESS NOTE ADULT - SUBJECTIVE AND OBJECTIVE BOX
SUBJECTIVE:    Patient is a 70y old Male who presents with a chief complaint of s/p syncope and fall, +ht, +loc, +xarelto with C2, T1, T2, T3 fracture (13 Oct 2020 13:33)    Currently admitted to medicine with the primary diagnosis of C2 cervical fracture       Today is hospital day 2d. This morning he is resting comfortably in bed and reports no new issues or overnight events.     Admit Diagnosis:  SYNCOPE;FACIAL ABRASION;CLOSED HEAD INJURY        PAST MEDICAL & SURGICAL HISTORY  Rectal cancer    Afib    HTN (hypertension)    H/O colonoscopy  1 YEAR  AGO rectal cancer    Stress fracture of right ankle  plate and screws    Rectal cancer    Afib    HTN (hypertension)    H/O colonoscopy    Stress fracture of right ankle        SOCIAL HISTORY:  Negative for smoking/alcohol/drug use.     ALLERGIES:  No Known Allergies    MEDICATIONS:  STANDING MEDICATIONS  acetaminophen   Tablet .. 650 milliGRAM(s) Oral every 6 hours  BACItracin   Ointment 1 Application(s) Topical daily  chlorhexidine 4% Liquid 1 Application(s) Topical <User Schedule>  dronedarone 400 milliGRAM(s) Oral two times a day  heparin   Injectable 5000 Unit(s) SubCutaneous every 8 hours  lisinopril 40 milliGRAM(s) Oral daily  metoprolol tartrate 50 milliGRAM(s) Oral two times a day  pantoprazole    Tablet 40 milliGRAM(s) Oral before breakfast  simvastatin 40 milliGRAM(s) Oral at bedtime    PRN MEDICATIONS  oxyCODONE    IR 5 milliGRAM(s) Oral every 6 hours PRN    VITALS:   T(F): 98.5  HR: --  BP: --  RR: 18  SpO2: --    I&Os:  10-12-20 @ 07:01  -  10-13-20 @ 07:00  --------------------------------------------------------  IN: 660 mL / OUT: 1350 mL / NET: -690 mL    10-13-20 @ 07:01  -  10-13-20 @ 13:43  --------------------------------------------------------  IN: 600 mL / OUT: 850 mL / NET: -250 mL        PHYSICAL EXAM:  GEN: No acute distress  LUNGS: Clear to auscultation bilaterally   HEART: S1/S2 present. RRR.   ABD: Soft, NT/ND. BS +  EXT: no cyanosis/edema  NEURO: AAOX3    LABS:                        10.5   5.26  )-----------( 172      ( 13 Oct 2020 07:17 )             33.0     10-13    135  |  99  |  20  ----------------------------<  91  4.4   |  24  |  1.1    Ca    8.6      13 Oct 2020 07:17  Phos  3.1     10-12  Mg     1.9     10-13    TPro  5.8<L>  /  Alb  3.5  /  TBili  0.7  /  DBili  x   /  AST  17  /  ALT  13  /  AlkPhos  73  10-13              CARDIAC MARKERS ( 12 Oct 2020 02:50 )  x     / <0.01 ng/mL / 418 U/L / x     / 3.0 ng/mL          RADIOLOGY:   SUBJECTIVE:    Patient is a 70y old Male who presents with a chief complaint of s/p syncope and fall, +ht, +loc, +xarelto with C2, T1, T2, T3 fracture (13 Oct 2020 13:33)    Currently admitted to medicine with the primary diagnosis of C2 cervical fracture     Today is hospital day 2d. This morning he is resting comfortably in bed and reports no new issues or overnight events. Pt complaining of constipation.    Admit Diagnosis:  SYNCOPE;FACIAL ABRASION;CLOSED HEAD INJURY        PAST MEDICAL & SURGICAL HISTORY  Rectal cancer    Afib    HTN (hypertension)    H/O colonoscopy  1 YEAR  AGO rectal cancer    Stress fracture of right ankle  plate and screws    Rectal cancer    Afib    HTN (hypertension)    H/O colonoscopy    Stress fracture of right ankle        SOCIAL HISTORY:  Negative for smoking/alcohol/drug use.     ALLERGIES:  No Known Allergies    MEDICATIONS:  STANDING MEDICATIONS  acetaminophen   Tablet .. 650 milliGRAM(s) Oral every 6 hours  BACItracin   Ointment 1 Application(s) Topical daily  chlorhexidine 4% Liquid 1 Application(s) Topical <User Schedule>  dronedarone 400 milliGRAM(s) Oral two times a day  heparin   Injectable 5000 Unit(s) SubCutaneous every 8 hours  lisinopril 40 milliGRAM(s) Oral daily  metoprolol tartrate 50 milliGRAM(s) Oral two times a day  pantoprazole    Tablet 40 milliGRAM(s) Oral before breakfast  simvastatin 40 milliGRAM(s) Oral at bedtime    PRN MEDICATIONS  oxyCODONE    IR 5 milliGRAM(s) Oral every 6 hours PRN    VITALS:   T(F): 98.5  HR: --  BP: --  RR: 18  SpO2: --    I&Os:  10-12-20 @ 07:01  -  10-13-20 @ 07:00  --------------------------------------------------------  IN: 660 mL / OUT: 1350 mL / NET: -690 mL    10-13-20 @ 07:01  -  10-13-20 @ 13:43  --------------------------------------------------------  IN: 600 mL / OUT: 850 mL / NET: -250 mL        PHYSICAL EXAM:  GEN: No acute distress  LUNGS: Clear to auscultation bilaterally   HEART: S1/S2 present. RRR.   ABD: Soft, NT/ND. BS +  EXT: no cyanosis/edema  NEURO: AAOX3    LABS:                        10.5   5.26  )-----------( 172      ( 13 Oct 2020 07:17 )             33.0     10-13    135  |  99  |  20  ----------------------------<  91  4.4   |  24  |  1.1    Ca    8.6      13 Oct 2020 07:17  Phos  3.1     10-12  Mg     1.9     10-13    TPro  5.8<L>  /  Alb  3.5  /  TBili  0.7  /  DBili  x   /  AST  17  /  ALT  13  /  AlkPhos  73  10-13    CARDIAC MARKERS ( 12 Oct 2020 02:50 )  x     / <0.01 ng/mL / 418 U/L / x     / 3.0 ng/mL    < from: 12 Lead ECG (10.13.20 @ 08:06) >  Diagnosis Line Normal sinus rhythm  Normal ECG    < end of copied text >      RADIOLOGY:
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
## 2020-10-13 NOTE — PROGRESS NOTE ADULT - SUBJECTIVE AND OBJECTIVE BOX
no chest pain   no sob   no events on tele   transferred from trauma service       Vital Signs Last 24 Hrs  T(C): 36.9 (13 Oct 2020 09:16), Max: 37.4 (12 Oct 2020 20:56)  T(F): 98.5 (13 Oct 2020 09:16), Max: 99.3 (12 Oct 2020 20:56)  HR: --  BP: --  BP(mean): --  RR: 18 (12 Oct 2020 20:56) (18 - 18)  SpO2: --    PHYSICAL EXAM:  GENERAL: NAD  Pulm: Clear to auscultation bilaterally; No wheeze  CV: Regular rate and rhythm; No murmurs, rubs, or gallops  GI: Soft, Nontender, Nondistended; Bowel sounds present  EXTREMITIES:  2+ Peripheral Pulses, No clubbing, cyanosis, or edema  PSYCH: AAOx3  NEUROLOGY: non-focal                            10.5   5.26  )-----------( 172      ( 13 Oct 2020 07:17 )             33.0     10-13    135  |  99  |  20  ----------------------------<  91  4.4   |  24  |  1.1    Ca    8.6      13 Oct 2020 07:17  Phos  3.1     10-12  Mg     1.9     10-13    TPro  5.8<L>  /  Alb  3.5  /  TBili  0.7  /  DBili  x   /  AST  17  /  ALT  13  /  AlkPhos  73  10-13    LIVER FUNCTIONS - ( 13 Oct 2020 07:17 )  Alb: 3.5 g/dL / Pro: 5.8 g/dL / ALK PHOS: 73 U/L / ALT: 13 U/L / AST: 17 U/L / GGT: x             CARDIAC MARKERS ( 12 Oct 2020 02:50 )  x     / <0.01 ng/mL / 418 U/L / x     / 3.0 ng/mL      MEDICATIONS  (STANDING):  acetaminophen   Tablet .. 650 milliGRAM(s) Oral every 6 hours  BACItracin   Ointment 1 Application(s) Topical daily  chlorhexidine 4% Liquid 1 Application(s) Topical <User Schedule>  dronedarone 400 milliGRAM(s) Oral two times a day  heparin   Injectable 5000 Unit(s) SubCutaneous every 8 hours  lisinopril 40 milliGRAM(s) Oral daily  metoprolol tartrate 50 milliGRAM(s) Oral two times a day  pantoprazole    Tablet 40 milliGRAM(s) Oral before breakfast  simvastatin 40 milliGRAM(s) Oral at bedtime    MEDICATIONS  (PRN):  oxyCODONE    IR 5 milliGRAM(s) Oral every 6 hours PRN Severe Pain (7 - 10)

## 2020-10-13 NOTE — CONSULT NOTE ADULT - REASON FOR ADMISSION
s/p syncope and fall, +ht, +loc, +xarelto with C2, T1, T2, T3 fracture

## 2020-10-13 NOTE — CHART NOTE - NSCHARTNOTEFT_GEN_A_CORE
Cardiac Electrophysiology Procedure Note    Procedure:  [employer]  Implantable Loop Recorder Implant    Indication: syncope  Attending:	Juan José Lozano MD    EQUIPMENT IMPLANTED      [employer] Linq  Serial Number  [id number]        DESCRIPTION OF PROCEDURE  The patient was brought to the Procedure Room in a nonsedated and fasting state, having received preoperative antibiotics. Informed, written consent was obtained prior to the procedure.  The left shoulder region was cleaned and prepped with serial applications of Chlorhexidine. Patient was then covered with sterile drapes in the usual manner. Blood pressure, oxygenation and level of comfort were stable throughout.   	The left parasternal region was defined; 10 cc of lidocaine solution were infiltrated into the skin overlying the left deltopectoral groove. A 5 mm. incision was then made. The loop recorder was injected under the skin..     The wound margins approximated well, without any tension or overlap. The wound was closed using dermabond  A dry, sterile dressing was placed over this.     COMPLICATIONS:  The patient tolerated the procedure well. There were no immediate complications.    CONCLUSIONS:  Successful implant of loop recorder.

## 2020-10-13 NOTE — CONSULT NOTE ADULT - SUBJECTIVE AND OBJECTIVE BOX
Patient is a 70y old  Male who presents with a chief complaint of s/p syncope and fall, +ht, +loc, +xarelto with C2, T1, T2, T3 fracture (12 Oct 2020 13:35)        HPI:   70y old m  with pmhx hypertension, a fib on eliquis s/p syncope, +ht, +loc, +eliquis. The patient reports dizziness after taking benazepril (not uncommon), he stood up too quickly and had a syncopal episode. The patient reports +ht,+loc. The patient went to [facility], work up revealed a questionable C2 fracture, the patient was transferred [place] for further trauma work up and neurosurgery evaluation. In ED [place], the patient reports back pain, has an abrasion to his left forehead, otherwise no other external signs of trauma. Neurosurgery was consulted and recommended MRI, Continue [place] J, CTA of the neck. MRI was performed demonstrating previously noted C2 fracture, ligamentous injury can not be ruled out, acute fractures of T1. T2, T3 fractures, neurosurgery will review with attending. The patient will be admitted to [facility] for syncope work up, pt/rehab and cardiology/medicine consult. (11 Oct 2020 12:02)      Electrophysiology:  70y Male    REVIEW OF SYSTEMS    [ ] A ten-point review of systems was otherwise negative except as noted.  [ ] Due to altered mental status/intubation, subjective information were not able to be obtained from the patient. History was obtained, to the extent possible, from review of the chart and collateral sources of information.      PAST MEDICAL & SURGICAL HISTORY:  Rectal cancer    Afib    HTN (hypertension)    H/O colonoscopy  1 YEAR  AGO rectal cancer    Stress fracture of right ankle  plate and screws        Home Medications:  benazepril 40 mg oral tablet: 1 tab(s) orally once a day (11 Oct 2020 12:02)  metoprolol tartrate 50 mg oral tablet: 1 tab(s) orally 2 times a day (11 Oct 2020 12:02)  Multaq 400 mg oral tablet: 1 tab(s) orally 2 times a day (11 Oct 2020 12:02)  simvastatin 40 mg oral tablet: 1 tab(s) orally once a day (at bedtime) (11 Oct 2020 12:02)  Xarelto 20 mg oral tablet: 1 tab(s) orally once a day (in the evening) (11 Oct 2020 12:02)      Allergies:  No Known Allergies      FAMILY HISTORY:      SOCIAL HISTORY:    CIGARETTES:  ALCOHOL:        PREVIOUS DIAGNOSTIC TESTING:      ECHO  FINDINGS:    STRESS  FINDINGS:    CATHETERIZATION  FINDINGS:    ELECTROPHYSIOLOGY STUDY  FINDINGS:    CAROTID ULTRASOUND:  FINDINGS    VENOUS DUPLEX SCAN:  FINDINGS:    CHEST CT PULMONARY ANGIO with IV Contrast:  FINDINGS:      MEDICATIONS  (STANDING):  acetaminophen   Tablet .. 650 milliGRAM(s) Oral every 6 hours  BACItracin   Ointment 1 Application(s) Topical daily  chlorhexidine 4% Liquid 1 Application(s) Topical <User Schedule>  dronedarone 400 milliGRAM(s) Oral two times a day  heparin   Injectable 5000 Unit(s) SubCutaneous every 8 hours  lisinopril 40 milliGRAM(s) Oral daily  metoprolol tartrate 50 milliGRAM(s) Oral two times a day  pantoprazole    Tablet 40 milliGRAM(s) Oral before breakfast  simvastatin 40 milliGRAM(s) Oral at bedtime    MEDICATIONS  (PRN):  oxyCODONE    IR 5 milliGRAM(s) Oral every 6 hours PRN Severe Pain (7 - 10)      Vital Signs Last 24 Hrs  T(C): 36.9 (13 Oct 2020 09:16), Max: 37.4 (12 Oct 2020 20:56)  T(F): 98.5 (13 Oct 2020 09:16), Max: 99.3 (12 Oct 2020 20:56)  HR: 78 (12 Oct 2020 13:07) (78 - 78)  BP: 135/65 (12 Oct 2020 13:07) (135/65 - 135/65)  BP(mean): --  RR: 18 (12 Oct 2020 20:56) (18 - 18)  SpO2: --    PHYSICAL EXAM:    GENERAL: In no apparent distress, well nourished, and hydrated.  HEAD:  Atraumatic, Normocephalic  EYES: EOMI, PERRLA, conjunctiva and sclera clear  NECK: Supple and normal thyroid.  No JVD or carotid bruit.  Carotid pulse is 2+ bilaterally.  HEART: Regular rate and rhythm; No murmurs, rubs, or gallops.  PULMONARY: Clear to auscultation and perfusion.  No rales, wheezing, or rhonchi bilaterally.  ABDOMEN: Soft, Nontender, Nondistended; Bowel sounds present  EXTREMITIES:  2+ Peripheral Pulses, No clubbing, cyanosis, or edema  NEUROLOGICAL: Grossly nonfocal    I&O's Detail    12 Oct 2020 07:01  -  13 Oct 2020 07:00  --------------------------------------------------------  IN:    Oral Fluid: 660 mL  Total IN: 660 mL    OUT:    Voided (mL): 1350 mL  Total OUT: 1350 mL    Total NET: -690 mL      13 Oct 2020 07:01  -  13 Oct 2020 11:43  --------------------------------------------------------  IN:    Oral Fluid: 300 mL  Total IN: 300 mL    OUT:    Voided (mL): 450 mL  Total OUT: 450 mL    Total NET: -150 mL        Daily     Daily     INTERPRETATION OF TELEMETRY:    ECG:        LABS:                        10.5   5.26  )-----------( 172      ( 13 Oct 2020 07:17 )             33.0     10-13    135  |  99  |  20  ----------------------------<  91  4.4   |  24  |  1.1    Ca    8.6      13 Oct 2020 07:17  Phos  3.1     10-12  Mg     1.9     10-13    TPro  5.8<L>  /  Alb  3.5  /  TBili  0.7  /  DBili  x   /  AST  17  /  ALT  13  /  AlkPhos  73  10-13    CARDIAC MARKERS ( 12 Oct 2020 02:50 )  x     / <0.01 ng/mL / 418 U/L / x     / 3.0 ng/mL          BNP            RADIOLOGY & ADDITIONAL STUDIES: Patient is a 70y old  Male who presents with a chief complaint of s/p syncope and fall, +ht, +loc, +xarelto with C2, T1, T2, T3 fracture (12 Oct 2020 13:35)        HPI:   70y old m  with pmhx hypertension, a fib on eliquis s/p syncope, +ht, +loc, +eliquis. The patient reports dizziness after taking benazepril (not uncommon), he stood up too quickly and had a syncopal episode. The patient reports +ht,+loc. The patient went to [facility], work up revealed a questionable C2 fracture, the [place] was [place] for further trauma work up and neurosurgery evaluation. In ED [place], the patient reports back pain, has an abrasion to his left forehead, otherwise no other external signs of trauma. Neurosurgery was consulted and recommended MRI, Continue [place] J, CTA of the neck. MRI was performed demonstrating previously noted C2 fracture, ligamentous injury can not be ruled out, acute fractures of T1. T2, T3 fractures, neurosurgery will review with attending. The patient will be admitted to [facility] for syncope work up, pt/rehab and cardiology/medicine consult. (11 Oct 2020 12:02)      Electrophysiology:  70y Male with h/o HTN, PAF on Eliquis, DCCV x2 in the past, admitted s/p syncope. Reports occasional dizziness after taking him ACEI, this time got up from sitting and passed out. Fell down hit back of the head and neck. Trauma work up revealed C2 and T1, T2, T3 fracture, No surgical intervention required, cervical collar at all times for 1 month. Xarelto on hold since admission, pending clearance. Echo did not revealed acute pathology, EF normal. Patient reports stress test 6 month ago with Dr Pacheco that was normal. Orthostatic VS negative for orthostatic changes. Patient denies any other symptoms besides dizziness after taking Benazapril, denies palpitations lightheadedness dyspnea, chest discomfort, physically active.        REVIEW OF SYSTEMS    [x ] A ten-point review of systems was otherwise negative except as noted.      PAST MEDICAL & SURGICAL HISTORY:  Rectal cancer    Afib    HTN (hypertension)    H/O colonoscopy  1 YEAR  AGO rectal cancer    Stress fracture of right ankle  plate and screws        Home Medications:  benazepril 40 mg oral tablet: 1 tab(s) orally once a day (11 Oct 2020 12:02)  metoprolol tartrate 50 mg oral tablet: 1 tab(s) orally 2 times a day (11 Oct 2020 12:02)  Multaq 400 mg oral tablet: 1 tab(s) orally 2 times a day (11 Oct 2020 12:02)  simvastatin 40 mg oral tablet: 1 tab(s) orally once a day (at bedtime) (11 Oct 2020 12:02)  Xarelto 20 mg oral tablet: 1 tab(s) orally once a day (in the evening) (11 Oct 2020 12:02)      Allergies:  No Known Allergies      FAMILY HISTORY: no contributory       SOCIAL HISTORY:    CIGARETTES: denies  ALCOHOL: social        PREVIOUS DIAGNOSTIC TESTING:      ECHO  FINDINGS:  < from: TTE Echo Complete w/o Contrast w/ Doppler (10.12.20 @ 08:54) >  Summary:   1. Left ventricular ejection fraction, by visual estimation, is 55-65%.   2. Normal global left ventricular systolic function.    PHYSICIAN INTERPRETATION:  Left Ventricle: The left ventricular internal cavity size is normal. Left ventricular wall thickness is normal. Global LV systolic function was normal. Left ventricular ejection fraction, by visual estimation, is 55-65%.  Right Ventricle: The right ventricle was not well visualized.  Left Atrium: Normal left atrial size.  Right Atrium: Right atrium was not well visualized.  Mitral Valve: The mitral valve is grossly normal in structure. No evidence of mitral stenosis. No mitral regurgitation.  Tricuspid Valve: The tricuspid valve is not well visualized. No tricuspid regurgitation visualized.  Aortic Valve: The aortic valve was not well visualized. No evidence of aortic stenosis. No aortic regurgitation.  Pulmonic Valve: The pulmonic valve was not well visualized.  Aorta: Aortic root measured at sinotubular junction is normal.  Pulmonary Artery: The pulmonary artery is not well visualized.  Venous: The inferior vena cava was normal sized, with respiratory size variation greater than 50%.      < end of copied text >    CAROTID ULTRASOUND:  FINDINGS    VENOUS DUPLEX SCAN:  FINDINGS:    CHEST CT PULMONARY ANGIO with IV Contrast:  FINDINGS:      MEDICATIONS  (STANDING):  acetaminophen   Tablet .. 650 milliGRAM(s) Oral every 6 hours  BACItracin   Ointment 1 Application(s) Topical daily  chlorhexidine 4% Liquid 1 Application(s) Topical <User Schedule>  dronedarone 400 milliGRAM(s) Oral two times a day  heparin   Injectable 5000 Unit(s) SubCutaneous every 8 hours  lisinopril 40 milliGRAM(s) Oral daily  metoprolol tartrate 50 milliGRAM(s) Oral two times a day  pantoprazole    Tablet 40 milliGRAM(s) Oral before breakfast  simvastatin 40 milliGRAM(s) Oral at bedtime    MEDICATIONS  (PRN):  oxyCODONE    IR 5 milliGRAM(s) Oral every 6 hours PRN Severe Pain (7 - 10)      Vital Signs Last 24 Hrs  T(C): 36.9 (13 Oct 2020 09:16), Max: 37.4 (12 Oct 2020 20:56)  T(F): 98.5 (13 Oct 2020 09:16), Max: 99.3 (12 Oct 2020 20:56)  HR: 78 (12 Oct 2020 13:07) (78 - 78)  BP: 135/65 (12 Oct 2020 13:07) (135/65 - 135/65)  BP(mean): --  RR: 18 (12 Oct 2020 20:56) (18 - 18)  SpO2: --    PHYSICAL EXAM:    GENERAL: In no apparent distress, well nourished, and hydrated.  HEAD:  Atraumatic, Normocephalic  EYES: EOMI, PERRLA, conjunctiva and sclera clear  NECK: Supple and normal thyroid.  No JVD or carotid bruit.  Carotid pulse is 2+ bilaterally.  HEART: Regular rate and rhythm; No murmurs, rubs, or gallops.  PULMONARY: Clear to auscultation and perfusion.  No rales, wheezing, or rhonchi bilaterally.  ABDOMEN: Soft, Nontender, Nondistended; Bowel sounds present  EXTREMITIES:  2+ Peripheral Pulses, No clubbing, cyanosis, or edema  NEUROLOGICAL: Grossly nonfocal    I&O's Detail    12 Oct 2020 07:01  -  13 Oct 2020 07:00  --------------------------------------------------------  IN:    Oral Fluid: 660 mL  Total IN: 660 mL    OUT:    Voided (mL): 1350 mL  Total OUT: 1350 mL    Total NET: -690 mL      13 Oct 2020 07:01  -  13 Oct 2020 11:43  --------------------------------------------------------  IN:    Oral Fluid: 300 mL  Total IN: 300 mL    OUT:    Voided (mL): 450 mL  Total OUT: 450 mL    Total NET: -150 mL        Daily     Daily     INTERPRETATION OF TELEMETRY:    ECG:    < from: 12 Lead ECG (10.11.20 @ 16:10) >  Ventricular Rate 87 BPM    Atrial Rate 87 BPM    P-R Interval 150 ms    QRS Duration 92 ms    Q-T Interval 390 ms    QTC Calculation(Bazett) 469 ms    P Axis 39 degrees    R Axis 23 degrees    T Axis -21 degrees    Diagnosis Line Normal sinus rhythm  Nonspecific ST abnormality  Abnormal ECG    < end of copied text >      LABS:                        10.5   5.26  )-----------( 172      ( 13 Oct 2020 07:17 )             33.0     10-13    135  |  99  |  20  ----------------------------<  91  4.4   |  24  |  1.1    Ca    8.6      13 Oct 2020 07:17  Phos  3.1     10-12  Mg     1.9     10-13    TPro  5.8<L>  /  Alb  3.5  /  TBili  0.7  /  DBili  x   /  AST  17  /  ALT  13  /  AlkPhos  73  10-13    CARDIAC MARKERS ( 12 Oct 2020 02:50 )  x     / <0.01 ng/mL / 418 U/L / x     / 3.0 ng/mL        BNP            RADIOLOGY & ADDITIONAL STUDIES:  < from: CT Angio Neck w/ IV Cont (10.11.20 @ 07:51) >  Findings:    There are normal origins of the innominate, left common carotid and left subclavian artery off the aortic arch.    There is normal contrast filling the bilateral common carotid arteries with normal carotid bifurcations at C3-4 level.  There is no significant stenosis involving the bilateral internal carotid arteries.  Normal branching pattern is noted of the bilateral external carotid arteries.    There is normal contrast filling bilateral vertebral arteries, the right being dominant.    Other:    Minimal mucosal thickening of the bilateral maxillary, ethmoid and left frontal sinuses.    Straightening of the normal curvature of the cervical spine likely secondary to muscle spasm versus positioning.    Multilevel degenerative changes cervical spine.    Previously described C2 fractures extending to involve the transverse process is better demonstrated on the CT scan of the cervical spine performed thesame day.    Impression:    1.  Unremarkable CTA of the neck.    2.  C2 fracture, better demonstrated on the CT scan of the cervical spine performed the same day.      < end of copied text >      < from: MR Cervical Spine No Cont (10.11.20 @ 09:58) >  IMPRESSION:    1.  Acute fractures involving C2, T1, T2, and T3.    2.  T4; minimal compression fracture seen on the  images, age indeterminate.    3.  Abnormal signal in the dorsal paraspinal soft tissues from C2 through C4 may represent ligamentous injury.    4.  C2-C3; degenerative changes and left foraminal narrowing.    5.  C3-C4, C4-C5, C5-C6, and C6-C7; disc osteophyte complexes, degenerative changes, and bilateral foraminal narrowing.    6.  Straightening of the normal cervical lordosis may be secondary to patient positioning or muscle spasm.    7.  Minimal prevertebral soft tissue swelling.    < end of copied text >

## 2020-10-13 NOTE — CONSULT NOTE ADULT - ATTENDING COMMENTS
syncope  - unknow etiology      I have explained the procedure to the patient.  I have explained the risks and benefits of the procedure to the patient.  There is approximately 1% chance of any major cardiovascular complication to occur. Complications include, but are not limited to infection or bleeding,  The patient understands the risk and would like to proceed with the procedure. Patient indicated that all of his questions were answered to his satisfaction and verbalized understanding.
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
## 2020-10-13 NOTE — DISCHARGE NOTE NURSING/CASE MANAGEMENT/SOCIAL WORK - PATIENT PORTAL LINK FT
You can access the FollowMyHealth Patient Portal offered by [facility] by registering at the following website: [email or web address]. By joining [employer]’s FollowMyHealth portal, you will also be able to view your health information using other applications (apps) compatible with our system.

## 2020-10-13 NOTE — PROGRESS NOTE ADULT - SUBJECTIVE AND OBJECTIVE BOX
Electrophysiology Brief Post-Op Note        PRE-OP DIAGNOSIS: syncope    POST-OP DIAGNOSIS: Syncope    PROCEDURE: Loop Implnat    Physician: Marta  Assistant: None    ESTIMATED BLOOD LOSS:  1     mL    ANESTHESIA TYPE:  [  ]General Anesthesia  [  ] Sedation  [X  ] Local/Regional    CONDITION  [  ] Critical  [  ] Serious  [  ]Fair  [ X ]Good      SPECIMENS REMOVED (IF APPLICABLE):  none    IMPLANTS (IF APPLICABLE)  Loop    FINDINGS: none    COMPLICATIONS: none     PLAN OF CARE  - FU 3-4 weeks

## 2020-10-14 ENCOUNTER — TRANSCRIPTION ENCOUNTER (OUTPATIENT)
Age: 70
End: 2020-10-14

## 2020-10-14 VITALS
HEART RATE: 78 BPM | SYSTOLIC BLOOD PRESSURE: 143 MMHG | TEMPERATURE: 97 F | RESPIRATION RATE: 18 BRPM | DIASTOLIC BLOOD PRESSURE: 66 MMHG

## 2020-10-14 PROCEDURE — 70450 CT HEAD/BRAIN W/O DYE: CPT | Mod: 26

## 2020-10-14 PROCEDURE — 99239 HOSP IP/OBS DSCHRG MGMT >30: CPT

## 2020-10-14 RX ADMIN — Medication 650 MILLIGRAM(S): at 11:39

## 2020-10-14 RX ADMIN — PANTOPRAZOLE SODIUM 40 MILLIGRAM(S): 20 TABLET, DELAYED RELEASE ORAL at 07:20

## 2020-10-14 RX ADMIN — Medication 650 MILLIGRAM(S): at 11:23

## 2020-10-14 RX ADMIN — DRONEDARONE 400 MILLIGRAM(S): 400 TABLET, FILM COATED ORAL at 05:48

## 2020-10-14 RX ADMIN — HEPARIN SODIUM 5000 UNIT(S): 5000 INJECTION INTRAVENOUS; SUBCUTANEOUS at 05:48

## 2020-10-14 RX ADMIN — Medication 650 MILLIGRAM(S): at 00:46

## 2020-10-14 RX ADMIN — Medication 650 MILLIGRAM(S): at 07:20

## 2020-10-14 RX ADMIN — Medication 50 MILLIGRAM(S): at 05:48

## 2020-10-14 RX ADMIN — LISINOPRIL 40 MILLIGRAM(S): 2.5 TABLET ORAL at 05:48

## 2020-10-14 RX ADMIN — Medication 1 APPLICATION(S): at 11:24

## 2020-10-14 RX ADMIN — Medication 650 MILLIGRAM(S): at 05:48

## 2020-10-14 RX ADMIN — Medication 650 MILLIGRAM(S): at 01:16

## 2020-10-14 NOTE — DISCHARGE NOTE PROVIDER - NSDCCPCAREPLAN_GEN_ALL_CORE_FT
PRINCIPAL DISCHARGE DIAGNOSIS  Diagnosis: Syncope  Assessment and Plan of Treatment: You were transferred to medicine service for syncope work up. Trauma surgery initially were managing your C2 fracture and neurosurgery were consulted as well. You were placed in a cervical collar and CTH was negative for bleed. For syncope work up, orthostatics were done and it was negative, echo was normal. EP was consulted and you were admitted to tele. No events on tele monitored were noted. EP placed a loop recorder to monitor for any events outpatient. CTH will be repeated per neurosurgery and if no changes seen, will resume xarelto. Please follow up with neurosurgery and EP outpatient in 2 weeks       PRINCIPAL DISCHARGE DIAGNOSIS  Diagnosis: Syncope  Assessment and Plan of Treatment: You were transferred to medicine service for syncope work up. Trauma surgery initially were managing your C2 fracture and neurosurgery were consulted as well. You were placed in a cervical collar and CTH was negative for bleed. For syncope work up, orthostatics were done and it was negative, echo was normal. EP was consulted and you were admitted to tele. No events on tele monitored were noted. EP placed a loop recorder to monitor for any events outpatient. CTH will be repeated per neurosurgery and if no changes seen, will resume xarelto. Please follow up with neurosurgery and EP outpatient in 3-4 weeks
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
## 2020-10-14 NOTE — DISCHARGE NOTE PROVIDER - HOSPITAL COURSE
`70y old m  with pmhx hypertension, a fib on eliquis s/p syncope, +ht, +loc, +eliquis. The patient reports dizziness after taking benazepril (not uncommon), he stood up too quickly and had a syncopal episode. The patient reports +ht,+loc. The patient went to [facility], work up revealed a questionable C2 fracture, the patient was transferred north for further trauma work up and neurosurgery evaluation. In [place], the patient reports back pain, has an abrasion to his left forehead, otherwise no other external signs of trauma. Neurosurgery was consulted and recommended MRI, Continue [place] J, CTA of the neck. MRI was performed demonstrating previously noted C2 fracture, ligamentous injury can not be ruled out, acute fractures of T1. T2, T3 fractures, neurosurgery will review with attending. The patient will be admitted to [facility] for syncope work up, pt/rehab and cardiology/medicine consult.    #Syncope, Fall sustaining  Acute fractures involving C2, T1, T2, and T3; minimal compression fracture of T4, suspected C2-C4 Ligamentous injury: MRI C-spine reviewed, Neurosurgery follow up recommended, patient has [place] collar in place, no intervention for T1-T4 fractures as per Neurosurgery, pain control, will need PT/OT, Orthostatics negative, fall precautions,2D-Echo showed no significant abnormalities, telemetry monitoring so far no events. seen by neurology and no need for any further neurological work up. Cardiology on board. EP placed loop recorder yesterday    #Paroxysmal AFIB:   - Xarelto on hold for now. - will continue if repeat CTH normal  - discussed with Neurosurgery. repeat CTH ordered; no intracranial bleed then will be ok to resume xarelto. Continue Metoprolol, Multaq  - EKG on 10/13: NSR    #HTN:   - /66 on lying down. Orthostatic vitals negative.  - keep meds current.

## 2020-10-14 NOTE — DISCHARGE NOTE PROVIDER - CARE PROVIDER_API CALL
Juan José Lozano; JOHN)  Cardiac Electrophysiology  [facility] [address]  [place] [address]  Phone: [phone]  Fax: [phone]  Follow Up Time: 2 weeks    Julián Velasquez  [address], [facility] [address]  [place] [address]  Phone: [phone]  Fax: [phone]  Follow Up Time: 2 weeks    Mahesh Sterling)  Cardiovascular Disease; Internal Medicine  [address] [place] [address], [facility] [address]  [place] [address]  Phone: [phone]  Fax: [phone]  Follow Up Time: 2 weeks   Juan José Lozano; JOHN)  Cardiac Electrophysiology  [address] [place] [address] [address]  [place] [address]  Phone: [phone]  Fax: [phone]  Follow Up Time: 1 month    Julián Velasquez  [address], [facility] [address]  [place] [address]  Phone: [phone]  Fax: [phone]  Follow Up Time: 2 weeks    Mahesh Sterling)  Cardiovascular Disease; Internal Medicine  [address] [place] [address], [facility] [address]  [place] [address]  Phone: [phone]  Fax: [phone]  Follow Up Time: 2 weeks

## 2020-10-14 NOTE — DISCHARGE NOTE PROVIDER - CARE PROVIDERS DIRECT ADDRESSES
[email or web address]
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
## 2020-10-14 NOTE — DISCHARGE NOTE PROVIDER - PROVIDER TOKENS
PROVIDER:[TOKEN:[[id number]],FOLLOWUP:[2 weeks]],PROVIDER:[TOKEN:[[id number]],FOLLOWUP:[2 weeks]],PROVIDER:[TOKEN:[[id number]],FOLLOWUP:[2 weeks]] PROVIDER:[TOKEN:[[id number]],FOLLOWUP:[1 month]],PROVIDER:[TOKEN:[[id number]],FOLLOWUP:[2 weeks]],PROVIDER:[TOKEN:[[id number]],FOLLOWUP:[2 weeks]]

## 2020-10-14 NOTE — DISCHARGE NOTE PROVIDER - NSDCFUSCHEDAPPT_GEN_ALL_CORE_FT
GET DURAN ; 11/18/2020 ; [facility] [address] [facility] GET [place]ales ; 12/01/2020 ; [facility] [address] GET [place] ; 12/10/2020 ; [facility] [address]

## 2020-10-14 NOTE — DISCHARGE NOTE PROVIDER - NSDCACTIVITY_GEN_ALL_CORE
No heavy lifting/straining/Do not make important decisions/Do not drive or operate machinery/Driving allowed

## 2020-10-14 NOTE — DISCHARGE NOTE PROVIDER - NSDCMRMEDTOKEN_GEN_ALL_CORE_FT
benazepril 40 mg oral tablet: 1 tab(s) orally once a day  metoprolol tartrate 50 mg oral tablet: 1 tab(s) orally 2 times a day  Multaq 400 mg oral tablet: 1 tab(s) orally 2 times a day  simvastatin 40 mg oral tablet: 1 tab(s) orally once a day (at bedtime)  Xarelto 20 mg oral tablet: 1 tab(s) orally once a day (in the evening)

## 2020-10-16 DIAGNOSIS — S22.019A UNSPECIFIED FRACTURE OF FIRST THORACIC VERTEBRA, INITIAL ENCOUNTER FOR CLOSED FRACTURE: ICD-10-CM
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
## 2020-10-16 DIAGNOSIS — S00.81XA ABRASION OF OTHER PART OF HEAD, INITIAL ENCOUNTER: ICD-10-CM

## 2020-10-16 DIAGNOSIS — I48.0 PAROXYSMAL ATRIAL FIBRILLATION: ICD-10-CM

## 2020-10-16 DIAGNOSIS — S22.029A UNSPECIFIED FRACTURE OF SECOND THORACIC VERTEBRA, INITIAL ENCOUNTER FOR CLOSED FRACTURE: ICD-10-CM

## 2020-10-16 DIAGNOSIS — Z92.21 PERSONAL HISTORY OF ANTINEOPLASTIC CHEMOTHERAPY: ICD-10-CM

## 2020-10-16 DIAGNOSIS — S00.83XA CONTUSION OF OTHER PART OF HEAD, INITIAL ENCOUNTER: ICD-10-CM

## 2020-10-16 DIAGNOSIS — Z85.048 PERSONAL HISTORY OF OTHER MALIGNANT NEOPLASM OF RECTUM, RECTOSIGMOID JUNCTION, AND ANUS: ICD-10-CM

## 2020-10-16 DIAGNOSIS — I10 ESSENTIAL (PRIMARY) HYPERTENSION: ICD-10-CM

## 2020-10-16 DIAGNOSIS — R55 SYNCOPE AND COLLAPSE: ICD-10-CM
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
## 2020-10-16 DIAGNOSIS — Z79.01 LONG TERM (CURRENT) USE OF ANTICOAGULANTS: ICD-10-CM

## 2020-10-16 DIAGNOSIS — R04.0 EPISTAXIS: ICD-10-CM

## 2020-10-16 DIAGNOSIS — S12.100A UNSPECIFIED DISPLACED FRACTURE OF SECOND CERVICAL VERTEBRA, INITIAL ENCOUNTER FOR CLOSED FRACTURE: ICD-10-CM

## 2020-10-16 DIAGNOSIS — Z92.3 PERSONAL HISTORY OF IRRADIATION: ICD-10-CM
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
## 2020-10-16 DIAGNOSIS — S22.039A UNSPECIFIED FRACTURE OF THIRD THORACIC VERTEBRA, INITIAL ENCOUNTER FOR CLOSED FRACTURE: ICD-10-CM

## 2020-10-20 ENCOUNTER — NON-APPOINTMENT (OUTPATIENT)
Age: 70
End: 2020-10-20

## 2020-10-21 ENCOUNTER — APPOINTMENT (OUTPATIENT)
Dept: CARDIOLOGY | Facility: CLINIC | Age: 70
End: 2020-10-21
Payer: MEDICARE

## 2020-10-21 VITALS
DIASTOLIC BLOOD PRESSURE: 80 MMHG | WEIGHT: 315 LBS | TEMPERATURE: 98.7 F | SYSTOLIC BLOOD PRESSURE: 140 MMHG | BODY MASS INDEX: 40.43 KG/M2 | HEIGHT: 74 IN

## 2020-10-21 PROCEDURE — 99214 OFFICE O/P EST MOD 30 MIN: CPT

## 2020-10-21 PROCEDURE — 93000 ELECTROCARDIOGRAM COMPLETE: CPT

## 2020-10-23 ENCOUNTER — APPOINTMENT (OUTPATIENT)
Dept: NEUROLOGY | Facility: CLINIC | Age: 70
End: 2020-10-23
Payer: MEDICARE

## 2020-10-23 VITALS
HEART RATE: 88 BPM | SYSTOLIC BLOOD PRESSURE: 152 MMHG | TEMPERATURE: 97.6 F | BODY MASS INDEX: 40.43 KG/M2 | HEIGHT: 74 IN | OXYGEN SATURATION: 97 % | WEIGHT: 315 LBS | DIASTOLIC BLOOD PRESSURE: 93 MMHG

## 2020-10-23 PROCEDURE — 99213 OFFICE O/P EST LOW 20 MIN: CPT

## 2020-10-27 NOTE — HISTORY OF PRESENT ILLNESS
[FreeTextEntry1] : Patient is a 70 year old man with pmhx hypertension, a fib on eliquis s/p syncope, +ht, +loc, +eliquis. The patient reports dizziness after taking benazepril (not uncommon), he stood up too quickly and had a syncopal episode. The patient reports +ht,+loc. The patient went to [facility], work up revealed a questionable C2 fracture, the patient was transferred north for further trauma work up and\par neurosurgery evaluation. In ED north, the patient reports back pain, has an abrasion to his left forehead, otherwise no other external signs of trauma.  Neurosurgery was consulted and recommended MRI, Continue [place] J, CTA of the neck. MRI was performed demonstrating previously noted C2 fracture, ligamentous injury can not be ruled out, acute fractures of T1. T2, T3 fractures,\par neurosurgery will review with attending. The patient will be admitted to tele\par for syncope work up, pt/rehab and cardiology/medicine consult. (11 Oct 2020\par 12:02)\par \par Patient seen and examined and reviewed history with patient and through notes\par in EMR. Reviewed imaging, labs, vitals and meds. Patient gets episodes\par frequently when standing up of feeling lightheaded and will need to sit for a\par little bit longer before walking. This episode he got up and then passed out.\par Had one similar episode 3 years ago after he received chemo when getting out of\par a car at his [facility].

## 2020-10-27 NOTE — ASSESSMENT
[FreeTextEntry1] :  This is a 70y year old Male presenting with syncope and fall\par with trauma. Given his history of lightheadedness frequently when getting up\par most likely this episode was an orthostatic hypotensive episode with syncope.\par NO clear indication of a seizure\par 1. No Routine EEG indicated\par 2. NO further neurological workup for syncopal episode\par 3. Please recall PRN\par \par Plan:\par 1. Follow up with Neurosurgery\par 2. Follow up as needed

## 2020-10-29 ENCOUNTER — APPOINTMENT (OUTPATIENT)
Dept: NEUROSURGERY | Facility: CLINIC | Age: 70
End: 2020-10-29
Payer: MEDICARE

## 2020-10-29 VITALS — WEIGHT: 315 LBS | HEIGHT: 74 IN | BODY MASS INDEX: 40.43 KG/M2

## 2020-10-29 VITALS — WEIGHT: 315 LBS | BODY MASS INDEX: 40.43 KG/M2 | HEIGHT: 74 IN

## 2020-10-29 DIAGNOSIS — Z78.9 OTHER SPECIFIED HEALTH STATUS: ICD-10-CM

## 2020-10-29 DIAGNOSIS — Z86.69 PERSONAL HISTORY OF OTHER DISEASES OF THE NERVOUS SYSTEM AND SENSE ORGANS: ICD-10-CM

## 2020-10-29 DIAGNOSIS — Z85.9 PERSONAL HISTORY OF MALIGNANT NEOPLASM, UNSPECIFIED: ICD-10-CM

## 2020-10-29 DIAGNOSIS — Z86.79 PERSONAL HISTORY OF OTHER DISEASES OF THE CIRCULATORY SYSTEM: ICD-10-CM

## 2020-10-29 PROCEDURE — 99204 OFFICE O/P NEW MOD 45 MIN: CPT

## 2020-10-29 NOTE — ASSESSMENT
[FreeTextEntry1] : We had a thorough discussion regarding his condition. I would like xrays of the cervical spine with flexion and extension views to rule out instability. He will have this done at [facility] in the next few days. If there is no instability then he may discontinue the soft collar and consider outpatient PT. He will remain in the soft collar pending his xray results. We will do a telehealth visit Monday to discuss the results.

## 2020-10-29 NOTE — HISTORY OF PRESENT ILLNESS
[de-identified] : Mr. Tao was admitted to [facility] on 10/11/2020 after a syncopal episode. +fall, +loc. He is on Xarelto. Upon work up he was found to have fractures of C2, T1, T2, T3,T4. He was placed in a hard collar and was advised to follow up outpatient. During his hospitalization an MRI cervical spine was performed. This demonstrated acute fractures involving C2, T1, T2, T3, and T4, suspected C2-C4 Ligamentous injury. \par \par Today he presents for follow up. He has some stiffness in the neck however pain is minimal. No radiculopathy. He is now wearing a soft collar. He states he discontinued the hard collar over the weekend. He has restarted his Xarelto. \par \par On exam today, he is alert and oriented. No distress. Gait is steady. He is walking without assistance. Motor is intact. He has baseline peripheral neuropathy from prior chemotherapy treatments. No myelopathy. Neck is nontender. Mild trapezial spasms. He denies pain with ROM cervical spine in flexion, extension, and lateral rotation. Just some mild stiffness .+ Soft collar.

## 2020-11-02 ENCOUNTER — APPOINTMENT (OUTPATIENT)
Dept: NEUROSURGERY | Facility: CLINIC | Age: 70
End: 2020-11-02
Payer: MEDICARE

## 2020-11-02 VITALS — HEIGHT: 74 IN | WEIGHT: 315 LBS | BODY MASS INDEX: 40.43 KG/M2

## 2020-11-02 DIAGNOSIS — S12.100A UNSPECIFIED DISPLACED FRACTURE OF SECOND CERVICAL VERTEBRA, INITIAL ENCOUNTER FOR CLOSED FRACTURE: ICD-10-CM

## 2020-11-02 DIAGNOSIS — M47.812 SPONDYLOSIS W/OUT MYELOPATHY OR RADICULOPATHY, CERVICAL REGION: ICD-10-CM

## 2020-11-02 PROCEDURE — 99441: CPT | Mod: 95

## 2020-11-02 NOTE — ASSESSMENT
[FreeTextEntry1] : We had a thorough discussion regarding his condition. He is aware of his xray findings. There is no instability on dynamic views. He may wean off the soft collar at this time. He does not wish to start PT. He will call barring any issues.

## 2020-11-02 NOTE — HISTORY OF PRESENT ILLNESS
[FreeTextEntry1] : Mr. Tao was admitted to [facility] on 10/11/2020 after a syncopal episode. +fall, +loc. He is on Xarelto. Upon work up he was found to have fractures of C2, T1, T2, T3,T4. He was placed in a hard collar and was advised to follow up outpatient. During his hospitalization an MRI cervical spine was performed. This demonstrated acute fractures involving C2, T1, T2, T3, and T4, suspected C2-C4 Ligamentous injury. \par \par He has some stiffness in the neck however pain is minimal. No radiculopathy. He is wearing a soft collar. He states he discontinued the hard collar last weekend. He has restarted his Xarelto. \par \par Last visit, xrays of the cervical spine were ordered. The films were reviewed today. He has diffuse cervical spondylosis however no instability on flexion and extension views. \par

## 2020-11-02 NOTE — REASON FOR VISIT
[Home] : at home, [unfilled] , at the time of the visit. [Medical Office: ([facility] [place])___] : at the medical office located in  [Verbal consent obtained from patient] : the patient, [unfilled]

## 2020-11-12 ENCOUNTER — RX RENEWAL (OUTPATIENT)
Age: 70
End: 2020-11-12

## 2020-11-17 ENCOUNTER — APPOINTMENT (OUTPATIENT)
Dept: CARDIOLOGY | Facility: CLINIC | Age: 70
End: 2020-11-17
Payer: MEDICARE

## 2020-11-17 PROCEDURE — G2066: CPT

## 2020-11-17 PROCEDURE — 93298 REM INTERROG DEV EVAL SCRMS: CPT

## 2020-11-18 ENCOUNTER — APPOINTMENT (OUTPATIENT)
Dept: CARDIOLOGY | Facility: CLINIC | Age: 70
End: 2020-11-18
Payer: MEDICARE

## 2020-11-18 VITALS
SYSTOLIC BLOOD PRESSURE: 137 MMHG | HEIGHT: 73 IN | WEIGHT: 315 LBS | BODY MASS INDEX: 41.75 KG/M2 | HEART RATE: 62 BPM | DIASTOLIC BLOOD PRESSURE: 87 MMHG | TEMPERATURE: 96.6 F

## 2020-11-18 PROCEDURE — 99213 OFFICE O/P EST LOW 20 MIN: CPT

## 2020-11-18 PROCEDURE — 93291 INTERROG DEV EVAL SCRMS IP: CPT

## 2020-11-18 RX ORDER — ATORVASTATIN CALCIUM 40 MG/1
40 TABLET, FILM COATED ORAL DAILY
Refills: 0 | Status: DISCONTINUED | COMMUNITY
End: 2020-11-18

## 2020-11-27 NOTE — PROCEDURE
[No] : not [NSR] : normal sinus rhythm [Longevity: ___ months] : The estimated remaining battery life is [unfilled] months [de-identified] : Medtronic Reveal LINQ [de-identified] : LNQ11 [de-identified] : [id number] [de-identified] : 10/13/2020 [de-identified] : no event

## 2020-11-27 NOTE — PHYSICAL EXAM
[General Appearance - Well Developed] : well developed [Heart Rate And Rhythm] : heart rate and rhythm were normal [Lungs Percussion] : the lungs were normal to percussion [Clean] : clean [Dry] : dry [Well-Healed] : well-healed [Abdomen Soft] : soft [FreeTextEntry1] : PVD discolored LE

## 2020-11-27 NOTE — ASSESSMENT
[FreeTextEntry1] : s/p ILR implant for syncope\par Known Afib\par Taking metoprolol tartrate once a day as prescribed by his cardiologist.\par On multaq\par c/w xarelto 20mg daily - denies any bleeding issue\par Stable parameter\par No events\par \par Incision site has healed \par \par on remote monitoring and transmitting appropriately\par \par RTO in 3 months with Dr. Lozano

## 2020-11-27 NOTE — HISTORY OF PRESENT ILLNESS
[de-identified] : Cardiologist: Dr. Healy\par 70 years old admitted to [facility] on 10/11/2020 for syncope  and fall. PMH includes HTN, afib on xarelto and multaq , has intermittent dizziness, syncope.\par \par Patient sustained acute fractures involving C2, T1, T2 and T3\par Nuero referred to EP -An ILR was implanted  on 10/13/2020 for long term monitoring for syncope \par \par Presents for post implant f/u; wound check and device interrogation\par Denies any sob, RUST, PND, orthopnea, no palpitations, no dizziness,lightheadedness, denies chest pains\par ECG ( 11/18/2020) 64bpm sinus rhythm \par ECHO ( 10/12/2020) EF 55-65%

## 2020-11-28 ENCOUNTER — LABORATORY RESULT (OUTPATIENT)
Age: 70
End: 2020-11-28

## 2020-12-01 ENCOUNTER — APPOINTMENT (OUTPATIENT)
Dept: CARDIOLOGY | Facility: CLINIC | Age: 70
End: 2020-12-01
Payer: MEDICARE

## 2020-12-01 PROCEDURE — 93306 TTE W/DOPPLER COMPLETE: CPT

## 2020-12-10 ENCOUNTER — APPOINTMENT (OUTPATIENT)
Dept: CARDIOLOGY | Facility: CLINIC | Age: 70
End: 2020-12-10
Payer: MEDICARE

## 2020-12-10 VITALS
BODY MASS INDEX: 41.75 KG/M2 | HEIGHT: 73 IN | DIASTOLIC BLOOD PRESSURE: 80 MMHG | SYSTOLIC BLOOD PRESSURE: 130 MMHG | TEMPERATURE: 96.7 F | HEART RATE: 62 BPM | WEIGHT: 315 LBS

## 2020-12-10 PROCEDURE — 99214 OFFICE O/P EST MOD 30 MIN: CPT

## 2020-12-10 PROCEDURE — 93000 ELECTROCARDIOGRAM COMPLETE: CPT

## 2020-12-16 ENCOUNTER — RX RENEWAL (OUTPATIENT)
Age: 70
End: 2020-12-16

## 2020-12-18 ENCOUNTER — APPOINTMENT (OUTPATIENT)
Dept: CARDIOLOGY | Facility: CLINIC | Age: 70
End: 2020-12-18

## 2020-12-22 ENCOUNTER — APPOINTMENT (OUTPATIENT)
Dept: ELECTROPHYSIOLOGY | Facility: CLINIC | Age: 70
End: 2020-12-22

## 2020-12-28 ENCOUNTER — FORM ENCOUNTER (OUTPATIENT)
Age: 70
End: 2020-12-28

## 2021-01-13 ENCOUNTER — APPOINTMENT (OUTPATIENT)
Dept: ELECTROPHYSIOLOGY | Facility: CLINIC | Age: 71
End: 2021-01-13

## 2021-01-22 ENCOUNTER — APPOINTMENT (OUTPATIENT)
Dept: CARDIOLOGY | Facility: CLINIC | Age: 71
End: 2021-01-22
Payer: MEDICARE

## 2021-01-22 PROCEDURE — 93298 REM INTERROG DEV EVAL SCRMS: CPT

## 2021-01-22 PROCEDURE — G2066: CPT

## 2021-02-16 ENCOUNTER — APPOINTMENT (OUTPATIENT)
Dept: CARDIOLOGY | Facility: CLINIC | Age: 71
End: 2021-02-16
Payer: MEDICARE

## 2021-02-16 VITALS — TEMPERATURE: 97.2 F | BODY MASS INDEX: 45.12 KG/M2 | WEIGHT: 315 LBS

## 2021-02-16 PROCEDURE — 93291 INTERROG DEV EVAL SCRMS IP: CPT

## 2021-02-16 NOTE — PROCEDURE
[No] : not [NSR] : normal sinus rhythm [Longevity: ___ months] : The estimated remaining battery life is [unfilled] months [de-identified] : Medtronic Reveal LINQ [de-identified] : LNQ11 [de-identified] : [id number] [de-identified] : 10/13/2020 [de-identified] : no event

## 2021-02-16 NOTE — HISTORY OF PRESENT ILLNESS
[de-identified] : Cardiologist: Dr. Healy\par 70 years old admitted to [facility] on 10/11/2020 for syncope  and fall. PMH includes HTN, afib on xarelto and multaq , has intermittent dizziness, syncope.\par \par Patient sustained acute fractures involving C2, T1, T2 and T3\par Nuero referred to EP -An ILR was implanted  on 10/13/2020 for long term monitoring for syncope \par \par Presents for post implant f/u; wound check and device interrogation\par Denies any sob, RUST, PND, orthopnea, no palpitations, no dizziness,lightheadedness, denies chest pains\par ECG ( 11/18/2020) 64bpm sinus rhythm \par ECHO ( 10/12/2020) EF 55-65%

## 2021-03-18 ENCOUNTER — APPOINTMENT (OUTPATIENT)
Dept: CARDIOLOGY | Facility: CLINIC | Age: 71
End: 2021-03-18
Payer: MEDICARE

## 2021-03-18 ENCOUNTER — NON-APPOINTMENT (OUTPATIENT)
Age: 71
End: 2021-03-18

## 2021-03-18 PROCEDURE — G2066: CPT

## 2021-03-18 PROCEDURE — 93298 REM INTERROG DEV EVAL SCRMS: CPT

## 2021-04-20 ENCOUNTER — NON-APPOINTMENT (OUTPATIENT)
Age: 71
End: 2021-04-20

## 2021-04-20 ENCOUNTER — APPOINTMENT (OUTPATIENT)
Dept: CARDIOLOGY | Facility: CLINIC | Age: 71
End: 2021-04-20
Payer: MEDICARE

## 2021-04-20 PROCEDURE — 93298 REM INTERROG DEV EVAL SCRMS: CPT

## 2021-04-20 PROCEDURE — G2066: CPT

## 2021-05-08 ENCOUNTER — RX RENEWAL (OUTPATIENT)
Age: 71
End: 2021-05-08

## 2021-05-21 ENCOUNTER — LABORATORY RESULT (OUTPATIENT)
Age: 71
End: 2021-05-21

## 2021-05-25 ENCOUNTER — APPOINTMENT (OUTPATIENT)
Dept: CARDIOLOGY | Facility: CLINIC | Age: 71
End: 2021-05-25
Payer: MEDICARE

## 2021-05-25 ENCOUNTER — NON-APPOINTMENT (OUTPATIENT)
Age: 71
End: 2021-05-25

## 2021-05-25 PROCEDURE — G2066: CPT

## 2021-05-25 PROCEDURE — 93298 REM INTERROG DEV EVAL SCRMS: CPT

## 2021-05-26 ENCOUNTER — RESULT CHARGE (OUTPATIENT)
Age: 71
End: 2021-05-26

## 2021-05-26 ENCOUNTER — APPOINTMENT (OUTPATIENT)
Dept: CARDIOLOGY | Facility: CLINIC | Age: 71
End: 2021-05-26
Payer: MEDICARE

## 2021-05-26 VITALS
DIASTOLIC BLOOD PRESSURE: 62 MMHG | BODY MASS INDEX: 41.75 KG/M2 | HEIGHT: 73 IN | HEART RATE: 70 BPM | TEMPERATURE: 97.3 F | SYSTOLIC BLOOD PRESSURE: 110 MMHG | WEIGHT: 315 LBS

## 2021-05-26 PROCEDURE — 93000 ELECTROCARDIOGRAM COMPLETE: CPT

## 2021-05-26 PROCEDURE — 99213 OFFICE O/P EST LOW 20 MIN: CPT

## 2021-05-30 ENCOUNTER — RX RENEWAL (OUTPATIENT)
Age: 71
End: 2021-05-30

## 2021-06-29 ENCOUNTER — NON-APPOINTMENT (OUTPATIENT)
Age: 71
End: 2021-06-29

## 2021-06-29 ENCOUNTER — APPOINTMENT (OUTPATIENT)
Dept: CARDIOLOGY | Facility: CLINIC | Age: 71
End: 2021-06-29
Payer: MEDICARE

## 2021-06-29 PROCEDURE — 93298 REM INTERROG DEV EVAL SCRMS: CPT

## 2021-06-29 PROCEDURE — G2066: CPT

## 2021-08-03 ENCOUNTER — NON-APPOINTMENT (OUTPATIENT)
Age: 71
End: 2021-08-03

## 2021-08-03 ENCOUNTER — APPOINTMENT (OUTPATIENT)
Dept: CARDIOLOGY | Facility: CLINIC | Age: 71
End: 2021-08-03
Payer: MEDICARE

## 2021-08-03 PROCEDURE — 93298 REM INTERROG DEV EVAL SCRMS: CPT

## 2021-08-03 PROCEDURE — G2066: CPT

## 2021-08-30 ENCOUNTER — RX RENEWAL (OUTPATIENT)
Age: 71
End: 2021-08-30

## 2021-09-08 ENCOUNTER — NON-APPOINTMENT (OUTPATIENT)
Age: 71
End: 2021-09-08

## 2021-09-08 ENCOUNTER — APPOINTMENT (OUTPATIENT)
Dept: CARDIOLOGY | Facility: CLINIC | Age: 71
End: 2021-09-08
Payer: MEDICARE

## 2021-09-08 PROCEDURE — G2066: CPT

## 2021-09-08 PROCEDURE — 93298 REM INTERROG DEV EVAL SCRMS: CPT

## 2021-09-21 ENCOUNTER — APPOINTMENT (OUTPATIENT)
Dept: CARDIOLOGY | Facility: CLINIC | Age: 71
End: 2021-09-21
Payer: MEDICARE

## 2021-09-21 VITALS — TEMPERATURE: 97.9 F | WEIGHT: 315 LBS | BODY MASS INDEX: 41.75 KG/M2 | HEIGHT: 73 IN

## 2021-09-21 PROCEDURE — 93291 INTERROG DEV EVAL SCRMS IP: CPT

## 2021-09-21 PROCEDURE — 99213 OFFICE O/P EST LOW 20 MIN: CPT

## 2021-09-21 NOTE — HISTORY OF PRESENT ILLNESS
[de-identified] : Cardiologist: Dr. Healy\par 71 years old admitted to [facility] on 10/11/2020 for syncope  and fall. PMH includes HTN, afib on xarelto and multaq , has intermittent dizziness, syncope.\par \par Patient sustained acute fractures involving C2, T1, T2 and T3\par Nuero referred to EP -An ILR was implanted  on 10/13/2020 for long term monitoring for syncope \par \par Presents for post implant f/u; wound check and device interrogation\par Denies any sob, RUST, PND, orthopnea, no palpitations, no dizziness,lightheadedness, denies chest pains\par ECG ( 11/18/2020) 64bpm sinus rhythm \par ECHO ( 10/12/2020) EF 55-65%

## 2021-09-21 NOTE — ASSESSMENT
[FreeTextEntry1] : syncope\par - no evetn on loop recorder Per previous RD note pt with history of significant weight loss from  lbs 10/2017-> 80 lbs dosing weight 1/2019. Recently pt seems to have been able to gain weight slightly to current dosing weight 84.9 lbs (4/18/19). Pt with no food allergies, takes a multivitamin. Pt reports nausea but denies vomiting, pt admitted with SBO S/P NGT for decompression -pt self discontinued 4/19 900 mL output noted 4/19. Last BM noted today x 2. Pt denies difficulty chewing/swallowing. In house diet advanced to regular 4/19, pt was able to eat 2 hard boiled eggs, 2 slices of bread and apple juice this morning.

## 2021-09-21 NOTE — PHYSICAL EXAM
[General Appearance - Well Developed] : well developed [Normal Appearance] : normal appearance [Well Groomed] : well groomed [General Appearance - Well Nourished] : well nourished [No Deformities] : no deformities [General Appearance - In No Acute Distress] : no acute distress [Heart Rate And Rhythm] : heart rate and rhythm were normal [Heart Sounds] : normal S1 and S2 [Murmurs] : no murmurs present [Clean] : clean [Dry] : dry [Well-Healed] : well-healed [Abdomen Soft] : soft [Abdomen Tenderness] : non-tender [Abdomen Mass (___ Cm)] : no abdominal mass palpated [Nail Clubbing] : no clubbing of the fingernails [Cyanosis, Localized] : no localized cyanosis [Petechial Hemorrhages (___cm)] : no petechial hemorrhages [] : no ischemic changes

## 2021-09-21 NOTE — PROCEDURE
[No] : not [See Scanned Paceart Report] : See scanned paceart report [See Device Printout] : See device printout [NSR] : normal sinus rhythm [Longevity: ___ months] : The estimated remaining battery life is [unfilled] months [de-identified] : Medtronic Reveal LINQ [de-identified] : LNQ11 [de-identified] : [id number] [de-identified] : 10/13/2020 [de-identified] : no event

## 2021-10-05 ENCOUNTER — TRANSCRIPTION ENCOUNTER (OUTPATIENT)
Age: 71
End: 2021-10-05

## 2021-10-13 ENCOUNTER — APPOINTMENT (OUTPATIENT)
Dept: CARDIOLOGY | Facility: CLINIC | Age: 71
End: 2021-10-13
Payer: MEDICARE

## 2021-10-13 ENCOUNTER — NON-APPOINTMENT (OUTPATIENT)
Age: 71
End: 2021-10-13

## 2021-10-13 PROCEDURE — G2066: CPT

## 2021-10-13 PROCEDURE — 93298 REM INTERROG DEV EVAL SCRMS: CPT

## 2021-11-16 ENCOUNTER — NON-APPOINTMENT (OUTPATIENT)
Age: 71
End: 2021-11-16

## 2021-11-16 ENCOUNTER — APPOINTMENT (OUTPATIENT)
Dept: CARDIOLOGY | Facility: CLINIC | Age: 71
End: 2021-11-16
Payer: MEDICARE

## 2021-11-16 PROCEDURE — G2066: CPT

## 2021-11-16 PROCEDURE — 93298 REM INTERROG DEV EVAL SCRMS: CPT

## 2021-12-06 ENCOUNTER — APPOINTMENT (OUTPATIENT)
Dept: CARDIOLOGY | Facility: CLINIC | Age: 71
End: 2021-12-06
Payer: MEDICARE

## 2021-12-06 PROCEDURE — 93306 TTE W/DOPPLER COMPLETE: CPT

## 2021-12-09 ENCOUNTER — NON-APPOINTMENT (OUTPATIENT)
Age: 71
End: 2021-12-09

## 2021-12-22 ENCOUNTER — NON-APPOINTMENT (OUTPATIENT)
Age: 71
End: 2021-12-22

## 2021-12-22 ENCOUNTER — APPOINTMENT (OUTPATIENT)
Dept: CARDIOLOGY | Facility: CLINIC | Age: 71
End: 2021-12-22
Payer: MEDICARE

## 2021-12-22 PROCEDURE — 93298 REM INTERROG DEV EVAL SCRMS: CPT

## 2021-12-22 PROCEDURE — G2066: CPT

## 2021-12-30 ENCOUNTER — RX RENEWAL (OUTPATIENT)
Age: 71
End: 2021-12-30

## 2022-01-12 ENCOUNTER — EMERGENCY (EMERGENCY)
Facility: HOSPITAL | Age: 72
LOS: 0 days | Discharge: HOME | End: 2022-01-13
Attending: EMERGENCY MEDICINE | Admitting: EMERGENCY MEDICINE
Payer: MEDICARE

## 2022-01-12 VITALS
WEIGHT: 235.01 LBS | RESPIRATION RATE: 17 BRPM | HEIGHT: 74 IN | DIASTOLIC BLOOD PRESSURE: 59 MMHG | SYSTOLIC BLOOD PRESSURE: 129 MMHG | HEART RATE: 62 BPM | OXYGEN SATURATION: 97 % | TEMPERATURE: 97 F

## 2022-01-12 VITALS
DIASTOLIC BLOOD PRESSURE: 63 MMHG | OXYGEN SATURATION: 99 % | SYSTOLIC BLOOD PRESSURE: 129 MMHG | TEMPERATURE: 97 F | HEART RATE: 66 BPM | RESPIRATION RATE: 18 BRPM

## 2022-01-12 DIAGNOSIS — S06.9X9A UNSPECIFIED INTRACRANIAL INJURY WITH LOSS OF CONSCIOUSNESS OF UNSPECIFIED DURATION, INITIAL ENCOUNTER: ICD-10-CM
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
## 2022-01-12 DIAGNOSIS — S09.90XA UNSPECIFIED INJURY OF HEAD, INITIAL ENCOUNTER: ICD-10-CM

## 2022-01-12 DIAGNOSIS — I48.91 UNSPECIFIED ATRIAL FIBRILLATION: ICD-10-CM

## 2022-01-12 DIAGNOSIS — M84.371A STRESS FRACTURE, RIGHT ANKLE, INITIAL ENCOUNTER FOR FRACTURE: Chronic | ICD-10-CM
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
## 2022-01-12 DIAGNOSIS — Z98.890 OTHER SPECIFIED POSTPROCEDURAL STATES: Chronic | ICD-10-CM

## 2022-01-12 DIAGNOSIS — S01.81XA LACERATION WITHOUT FOREIGN BODY OF OTHER PART OF HEAD, INITIAL ENCOUNTER: ICD-10-CM

## 2022-01-12 DIAGNOSIS — Z23 ENCOUNTER FOR IMMUNIZATION: ICD-10-CM

## 2022-01-12 DIAGNOSIS — I10 ESSENTIAL (PRIMARY) HYPERTENSION: ICD-10-CM
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
## 2022-01-12 DIAGNOSIS — Z79.01 LONG TERM (CURRENT) USE OF ANTICOAGULANTS: ICD-10-CM

## 2022-01-12 LAB
ALBUMIN SERPL ELPH-MCNC: 4 G/DL — SIGNIFICANT CHANGE UP (ref 3.5–5.2)
ALP SERPL-CCNC: 101 U/L — SIGNIFICANT CHANGE UP (ref 30–115)
ALT FLD-CCNC: 15 U/L — SIGNIFICANT CHANGE UP (ref 0–41)
ANION GAP SERPL CALC-SCNC: 16 MMOL/L — HIGH (ref 7–14)
APTT BLD: 32.1 SEC — SIGNIFICANT CHANGE UP (ref 27–39.2)
AST SERPL-CCNC: 19 U/L — SIGNIFICANT CHANGE UP (ref 0–41)
BASOPHILS # BLD AUTO: 0.04 K/UL — SIGNIFICANT CHANGE UP (ref 0–0.2)
BASOPHILS NFR BLD AUTO: 0.7 % — SIGNIFICANT CHANGE UP (ref 0–1)
BILIRUB SERPL-MCNC: 0.4 MG/DL — SIGNIFICANT CHANGE UP (ref 0.2–1.2)
BLD GP AB SCN SERPL QL: SIGNIFICANT CHANGE UP
BUN SERPL-MCNC: 14 MG/DL — SIGNIFICANT CHANGE UP (ref 10–20)
CALCIUM SERPL-MCNC: 9 MG/DL — SIGNIFICANT CHANGE UP (ref 8.5–10.1)
CHLORIDE SERPL-SCNC: 102 MMOL/L — SIGNIFICANT CHANGE UP (ref 98–110)
CO2 SERPL-SCNC: 17 MMOL/L — SIGNIFICANT CHANGE UP (ref 17–32)
CREAT SERPL-MCNC: 1 MG/DL — SIGNIFICANT CHANGE UP (ref 0.7–1.5)
EOSINOPHIL # BLD AUTO: 0.17 K/UL — SIGNIFICANT CHANGE UP (ref 0–0.7)
EOSINOPHIL NFR BLD AUTO: 2.8 % — SIGNIFICANT CHANGE UP (ref 0–8)
ETHANOL SERPL-MCNC: 213 MG/DL — HIGH
GLUCOSE SERPL-MCNC: 90 MG/DL — SIGNIFICANT CHANGE UP (ref 70–99)
HCT VFR BLD CALC: 42.3 % — SIGNIFICANT CHANGE UP (ref 42–52)
HGB BLD-MCNC: 14.2 G/DL — SIGNIFICANT CHANGE UP (ref 14–18)
IMM GRANULOCYTES NFR BLD AUTO: 0.7 % — HIGH (ref 0.1–0.3)
INR BLD: 1.03 RATIO — SIGNIFICANT CHANGE UP (ref 0.65–1.3)
LACTATE SERPL-SCNC: 2.5 MMOL/L — HIGH (ref 0.7–2)
LIDOCAIN IGE QN: 31 U/L — SIGNIFICANT CHANGE UP (ref 7–60)
LYMPHOCYTES # BLD AUTO: 1.5 K/UL — SIGNIFICANT CHANGE UP (ref 1.2–3.4)
LYMPHOCYTES # BLD AUTO: 24.5 % — SIGNIFICANT CHANGE UP (ref 20.5–51.1)
MCHC RBC-ENTMCNC: 29.6 PG — SIGNIFICANT CHANGE UP (ref 27–31)
MCHC RBC-ENTMCNC: 33.6 G/DL — SIGNIFICANT CHANGE UP (ref 32–37)
MCV RBC AUTO: 88.3 FL — SIGNIFICANT CHANGE UP (ref 80–94)
MONOCYTES # BLD AUTO: 0.47 K/UL — SIGNIFICANT CHANGE UP (ref 0.1–0.6)
MONOCYTES NFR BLD AUTO: 7.7 % — SIGNIFICANT CHANGE UP (ref 1.7–9.3)
NEUTROPHILS # BLD AUTO: 3.9 K/UL — SIGNIFICANT CHANGE UP (ref 1.4–6.5)
NEUTROPHILS NFR BLD AUTO: 63.6 % — SIGNIFICANT CHANGE UP (ref 42.2–75.2)
NRBC # BLD: 0 /100 WBCS — SIGNIFICANT CHANGE UP (ref 0–0)
PLATELET # BLD AUTO: 219 K/UL — SIGNIFICANT CHANGE UP (ref 130–400)
POTASSIUM SERPL-MCNC: 4.3 MMOL/L — SIGNIFICANT CHANGE UP (ref 3.5–5)
POTASSIUM SERPL-SCNC: 4.3 MMOL/L — SIGNIFICANT CHANGE UP (ref 3.5–5)
PROT SERPL-MCNC: 6.8 G/DL — SIGNIFICANT CHANGE UP (ref 6–8)
PROTHROM AB SERPL-ACNC: 11.8 SEC — SIGNIFICANT CHANGE UP (ref 9.95–12.87)
RBC # BLD: 4.79 M/UL — SIGNIFICANT CHANGE UP (ref 4.7–6.1)
RBC # FLD: 13.2 % — SIGNIFICANT CHANGE UP (ref 11.5–14.5)
SODIUM SERPL-SCNC: 135 MMOL/L — SIGNIFICANT CHANGE UP (ref 135–146)
WBC # BLD: 6.12 K/UL — SIGNIFICANT CHANGE UP (ref 4.8–10.8)
WBC # FLD AUTO: 6.12 K/UL — SIGNIFICANT CHANGE UP (ref 4.8–10.8)

## 2022-01-12 PROCEDURE — 72170 X-RAY EXAM OF PELVIS: CPT | Mod: 26

## 2022-01-12 PROCEDURE — 70450 CT HEAD/BRAIN W/O DYE: CPT | Mod: 26,MA

## 2022-01-12 PROCEDURE — 99284 EMERGENCY DEPT VISIT MOD MDM: CPT

## 2022-01-12 PROCEDURE — 71045 X-RAY EXAM CHEST 1 VIEW: CPT | Mod: 26

## 2022-01-12 PROCEDURE — 71260 CT THORAX DX C+: CPT | Mod: 26,MA

## 2022-01-12 PROCEDURE — 72125 CT NECK SPINE W/O DYE: CPT | Mod: 26,MA

## 2022-01-12 PROCEDURE — 74177 CT ABD & PELVIS W/CONTRAST: CPT | Mod: 26

## 2022-01-12 RX ORDER — TETANUS TOXOID, REDUCED DIPHTHERIA TOXOID AND ACELLULAR PERTUSSIS VACCINE, ADSORBED 5; 2.5; 8; 8; 2.5 [IU]/.5ML; [IU]/.5ML; UG/.5ML; UG/.5ML; UG/.5ML
0.5 SUSPENSION INTRAMUSCULAR ONCE
Refills: 0 | Status: COMPLETED | OUTPATIENT
Start: 2022-01-12 | End: 2022-01-12

## 2022-01-12 RX ORDER — THIAMINE MONONITRATE (VIT B1) 100 MG
100 TABLET ORAL ONCE
Refills: 0 | Status: COMPLETED | OUTPATIENT
Start: 2022-01-12 | End: 2022-01-12

## 2022-01-12 RX ORDER — SODIUM CHLORIDE 9 MG/ML
250 INJECTION INTRAMUSCULAR; INTRAVENOUS; SUBCUTANEOUS ONCE
Refills: 0 | Status: COMPLETED | OUTPATIENT
Start: 2022-01-12 | End: 2022-01-12

## 2022-01-12 RX ADMIN — SODIUM CHLORIDE 250 MILLILITER(S): 9 INJECTION INTRAMUSCULAR; INTRAVENOUS; SUBCUTANEOUS at 20:26

## 2022-01-12 RX ADMIN — Medication 100 MILLIGRAM(S): at 20:56

## 2022-01-12 RX ADMIN — TETANUS TOXOID, REDUCED DIPHTHERIA TOXOID AND ACELLULAR PERTUSSIS VACCINE, ADSORBED 0.5 MILLILITER(S): 5; 2.5; 8; 8; 2.5 SUSPENSION INTRAMUSCULAR at 20:27

## 2022-01-12 NOTE — PROCEDURE NOTE - NSICDXPROCEDURE_GEN_ALL_CORE_FT
PROCEDURES:  Simple repair of laceration of face, 2.6 cm to 5.0 cm 12-Jan-2022 23:59:28  Kerwin Cheng

## 2022-01-12 NOTE — ED ADULT TRIAGE NOTE - CHIEF COMPLAINT QUOTE
BIBA and prenotification for mechanical slip & fall down steps  (+) head injury, (+) LOC, (+) AC xarelto

## 2022-01-12 NOTE — CONSULT NOTE ADULT - ASSESSMENT
ASSESSMENT:  71yM w/ PMHx of HTN, Afib on xarelto seen as a Trauma Alert s/p mechanical fall. Pt fell on his steps at home. +HT, -LOC, +AC (xarelto). Denies CP, SOB, abdominal pain, weakness/numbness. Trauma assessment in ED: ABCs intact , GCS 15 , AAOx3,  FLAHERTY.     Injuries identified:   - 3cm vertical midline laceration on forehead  -     PLAN:   - Trauma Labs: (CBC, BMP, Coags, T&S, UA, EtOH level)    Trauma Imaging to include the following:  - CXR, Pelvic Xray  - CT Head,  CT C-spine, CT Max/Face, CT Chest, CT Abd/Pelvis  - Extremity films: None    Additional consultations:  -    Disposition pending results of above labs and imaging  Above plan discussed with Trauma attending, Dr. Espinal, patient, patient family, and ED team  --------------------------------------------------------------------------------------  01-12-22 @ 20:17    TRAUMA SENIOR SPECTRA: [id number]  TRAUMA TEAM SPECTRA: [id number] ASSESSMENT:  71yM w/ PMHx of HTN, Afib on xarelto seen as a Trauma Alert s/p mechanical fall. Pt fell on his steps at home. +HT, -LOC, +AC (xarelto). Denies CP, SOB, abdominal pain, weakness/numbness. Trauma assessment in ED: ABCs intact , GCS 15 , AAOx3,  FLAHERTY.     Injuries identified:   - 4cm vertical midline laceration on forehead    PLAN:   - Panscan negative   - Forehead laceration repaired   - Cleared from trauma   - Dispo per ED     Above plan discussed with Trauma attending, Dr. Espinal, patient, patient family, and ED team  --------------------------------------------------------------------------------------  01-12-22 @ 20:17    TRAUMA SENIOR SPECTRA: [id number]  TRAUMA TEAM SPECTRA: [id number]

## 2022-01-12 NOTE — ED ADULT NURSE NOTE - NSIMPLEMENTINTERV_GEN_ALL_ED
Implemented All Fall with Harm Risk Interventions:  [place] to call system. Call bell, personal items and telephone within reach. Instruct patient to call for assistance. Room bathroom lighting operational. Non-slip footwear when patient is off stretcher. Physically safe environment: no spills, clutter or unnecessary equipment. Stretcher in lowest position, wheels locked, appropriate side rails in place. Provide visual cue, wrist band, yellow gown, etc. Monitor gait and stability. Monitor for mental status changes and reorient to person, place, and time. Review medications for side effects contributing to fall risk. Reinforce activity limits and safety measures with patient and family. Provide visual clues: red socks.

## 2022-01-12 NOTE — CONSULT NOTE ADULT - SUBJECTIVE AND OBJECTIVE BOX
TRAUMA ACTIVATION LEVEL:  CODE / ALERT  / CONSULT  ACTIVATED BY: EMS**  /  ED**  INTUBATED: YES** / NO**    MECHANISM OF INJURY:   [] Blunt     [] MVC	  [x] Fall	  [] Pedestrian Struck	  [] Motorcycle     [] Assault     [] Bicycle collision    [] Sports injury    [] Penetrating    [] Gun Shot Wound      [] Stab Wound    GCS: 15 	E: 4	V: 5	M: 6    HPI:  71yM w/ PMHx of HTN, Afib on xarelto seen as a Trauma Alert s/p mechanical fall. Pt fell on his steps at home. +HT, -LOC, +AC (xarelto). Denies CP, SOB, abdominal pain, weakness/numbness. Trauma assessment in ED: ABCs intact , GCS 15 , AAOx3.    PAST MEDICAL & SURGICAL HISTORY:  HTN (hypertension)  Afib  Rectal cancer  Stress fracture of right ankle  plate and screws  H/O colonoscopy  1 YEAR  AGO rectal cancer    Allergies  No Known Allergies  Intolerances    Home Medications:  benazepril 40 mg oral tablet: 1 tab(s) orally once a day (11 Oct 2020 12:02)  metoprolol tartrate 50 mg oral tablet: 1 tab(s) orally 2 times a day (11 Oct 2020 12:02)  Multaq 400 mg oral tablet: 1 tab(s) orally 2 times a day (11 Oct 2020 12:02)  simvastatin 40 mg oral tablet: 1 tab(s) orally once a day (at bedtime) (11 Oct 2020 12:02)  Xarelto 20 mg oral tablet: 1 tab(s) orally once a day (in the evening) (11 Oct 2020 12:02)    ROS: 10-system review is otherwise negative except HPI above.      Primary Survey:    A - airway intact  B - bilateral breath sounds and good chest rise  C - palpable pulses in all extremities  D - GCS 15 on arrival, FLAHERTY  Exposure obtained    Vital Signs Last 24 Hrs  T(C): --  T(F): --  HR: --  BP: --  BP(mean): --  RR: --  SpO2: --    Secondary Survey:   General: NAD  HEENT: Normocephalic, EOMI, PEERLA, +3cm vertical midline laceration on forehead  Neck: Soft, midline trachea. no c-spine tenderness  Chest: No chest wall tenderness, no subcutaneous emphysema   Cardiac: S1, S2, RRR  Respiratory: Bilateral breath sounds, clear and equal bilaterally  Abdomen: Soft, non-distended, non-tender, no rebound, no guarding.  Groin: Normal appearing, pelvis stable   Ext:  Moving b/l upper and lower extremities. Palpable Radial b/l UE, b/l DP palpable in LE. +Right knee abrasion  Back: No T/L/S spine tenderness, No palpable runoff/stepoff/deformity  Rectal: good tone    FAST: *****/Negative    Labs:  CAPILLARY BLOOD GLUCOSE  POCT Blood Glucose.: 98 mg/dL (12 Jan 2022 19:36)                        14.2   6.12  )-----------( 219      ( 12 Jan 2022 19:55 )             42.3       Auto Neutrophil %: 63.6 % (01-12-22 @ 19:55)  Auto Immature Granulocyte %: 0.7 % (01-12-22 @ 19:55)    LFTs:    Coags:    RADIOLOGY & ADDITIONAL STUDIES:    ---------------------------------------------------------------------------------------   TRAUMA ACTIVATION LEVEL:  CODE / ALERT  / CONSULT  ACTIVATED BY: EMS**  /  ED**  INTUBATED: YES** / NO**    MECHANISM OF INJURY:   [] Blunt     [] MVC	  [x] Fall	  [] Pedestrian Struck	  [] Motorcycle     [] Assault     [] Bicycle collision    [] Sports injury    [] Penetrating    [] Gun Shot Wound      [] Stab Wound    GCS: 15 	E: 4	V: 5	M: 6    HPI:  71yM w/ PMHx of HTN, Afib on xarelto seen as a Trauma Alert s/p mechanical fall. Pt fell on his steps at home. +HT, -LOC, +AC (xarelto). Denies CP, SOB, abdominal pain, weakness/numbness. Trauma assessment in ED: ABCs intact , GCS 15 , AAOx3.    PAST MEDICAL & SURGICAL HISTORY:  HTN (hypertension)  Afib  Rectal cancer  Stress fracture of right ankle  plate and screws  H/O colonoscopy  1 YEAR  AGO rectal cancer    Allergies  No Known Allergies  Intolerances    Home Medications:  benazepril 40 mg oral tablet: 1 tab(s) orally once a day (11 Oct 2020 12:02)  metoprolol tartrate 50 mg oral tablet: 1 tab(s) orally 2 times a day (11 Oct 2020 12:02)  Multaq 400 mg oral tablet: 1 tab(s) orally 2 times a day (11 Oct 2020 12:02)  simvastatin 40 mg oral tablet: 1 tab(s) orally once a day (at bedtime) (11 Oct 2020 12:02)  Xarelto 20 mg oral tablet: 1 tab(s) orally once a day (in the evening) (11 Oct 2020 12:02)    ROS: 10-system review is otherwise negative except HPI above.      Primary Survey:    A - airway intact  B - bilateral breath sounds and good chest rise  C - palpable pulses in all extremities  D - GCS 15 on arrival, FLAHERTY  Exposure obtained    Vital Signs Last 24 Hrs  T(C): --  T(F): --  HR: --  BP: --  BP(mean): --  RR: --  SpO2: --    Secondary Survey:   General: NAD  HEENT: Normocephalic, EOMI, PEERLA, +4cm vertical midline laceration on forehead  Neck: Soft, midline trachea. no c-spine tenderness  Chest: No chest wall tenderness, no subcutaneous emphysema   Cardiac: S1, S2, RRR  Respiratory: Bilateral breath sounds, clear and equal bilaterally  Abdomen: Soft, non-distended, non-tender, no rebound, no guarding.  Groin: Normal appearing, pelvis stable   Ext:  Moving b/l upper and lower extremities. Palpable Radial b/l UE, b/l DP palpable in LE. +Right knee abrasion  Back: No T/L/S spine tenderness, No palpable runoff/stepoff/deformity  Rectal: good tone    FAST: *****/Negative    Labs:  CAPILLARY BLOOD GLUCOSE  POCT Blood Glucose.: 98 mg/dL (12 Jan 2022 19:36)                        14.2   6.12  )-----------( 219      ( 12 Jan 2022 19:55 )             42.3       Auto Neutrophil %: 63.6 % (01-12-22 @ 19:55)  Auto Immature Granulocyte %: 0.7 % (01-12-22 @ 19:55)    LFTs:    Coags:    RADIOLOGY & ADDITIONAL STUDIES:  < from: Xray Pelvis AP only (01.12.22 @ 22:03) >  impression:  Right intertrochanteric region is not included. No definite femoral neck   fracture is seen. Lower lumbar degenerative changes are identified.  --- End of Report ---    < from: CT Abdomen and Pelvis w/ IV Cont (01.12.22 @ 21:48) >  IMPRESSION:  Slight progression of compression deformity involving T3 vertebral body.  Otherwise, no CT evidence of acute traumatic injury to chest, abdomen, or   pelvis.  --- End of Report ---    < from: CT Cervical Spine No Cont (01.12.22 @ 21:28) >  IMPRESSION:  CT HEAD:  No acute intracranial findings. Evidence of sinus disease.    CT CERVICAL SPINE:  No acute fracture or dislocation.  --- End of Report ---    ---------------------------------------------------------------------------------------

## 2022-01-12 NOTE — CONSULT NOTE ADULT - ATTENDING COMMENTS
Trauma Attending H&P Attestation    Patient seen and evaluated with the trauma team in the trauma bay upon arrival. All pertinent labs and radiographic imaging reviewed, pending final reports. Outpatient medications reviewed, including the presence of anticoagulants, if applicable. I agree with the resident's note above, including the physical exam findings, assessment and plan as documented with the following adjustments.     Trauma Level: [ ] Code  [x ] Alert  [ ] Consult [ ] Transfer in  Activation by:  [x ] ED physician [x ] EMS  Intubated in Field? [ ] Yes [x ] No  Intubated in ED? [ ] Yes [x ] No  Intubated in Trauma [place]? [ ] Yes [ x] No    GET DURAN Patient is a 71y old  Male who presents with a chief complaint of  fall and head injury with LOC+ as well as forehead laceration, while on Xarelto    Patient presented with GCS [15 ]  upon arrival to the trauma bay.  Allergies  No Known Allergies  Intolerances    PAST MEDICAL & SURGICAL HISTORY:  HTN (hypertension)  Afib  Rectal cancer  Stress fracture of right ankle plate and screws  H/O colonoscopy 1 YEAR  AGO rectal cancer    On AC/Antiplatelets [x ] Yes [ ] No              [x ] NOVACs, [ ] Coumadin, [ ] ASA, [ ] Antiplatelets     Vital Signs Last 24 Hrs  T(C): 36.1 (12 Jan 2022 20:46), Max: 36.1 (12 Jan 2022 20:46)  T(F): 97 (12 Jan 2022 20:46), Max: 97 (12 Jan 2022 20:46)  HR: 62 (12 Jan 2022 20:46) (62 - 62)  BP: 129/59 (12 Jan 2022 20:46) (129/59 - 129/59)  BP(mean): --  RR: 17 (12 Jan 2022 20:46) (17 - 17)  SpO2: 97% (12 Jan 2022 20:46) (97% - 97%)    PE: forehead laceration    Assessment: Fall on Xarelto    PLAN  - supportive care  - GI/DVT prophylaxis  - pain management  - repeat studies as needed  - complete and follow up on trauma work up included but not limites to                          [x ] CXR [ x] PXR [x ] Extremities X-RAYs                          [x ] NCHCT [x ] C-Spine CT [x ] CT Chest [x ] CT Abdomen/Pelvis                          [x ] FAST [ ] Other                          [ x] Trauma Labs                          [ ] Toxicology   - Follow up Consults  [ ] Neurosurgery [ ] Orthopaedics [ ] Plastics [ ] Fascial/OMFS [ ] Opthalmology [ ] Medicine [ ] Cardiology  - IV ABx give as indicated [x ] Yes [ ] No  - Tetanus given as indicated [x ] Yes [ ] No    Lena Espinal MD, FACS  Trauma/ACS/Surgical Critical Care Attending

## 2022-01-13 NOTE — ED PROVIDER NOTE - NS ED ROS FT
CONSTITUTIONAL:  see HPI  SKIN:  + laceration to forehead; no hematoma; no abrasion; no skin rash  EYES:  no visual changes  ENMT: no neck pain or stiffness  CARD:  no chest pain  RESP:  no cough or respiratory distress  ABD:  no abdominal pain, nausea, vomiting, or diarrhea  :  no dysuria, frequency or burning  MSK:  no back pain  NEURO:  no headache   Except as documented in the HPI,  all other systems are negative

## 2022-01-13 NOTE — ED PROVIDER NOTE - PROGRESS NOTE DETAILS
TD: Laceration repaired by trauma surgery team. Pt's trauma workup negative; pt ambulatory w/o assistance. Discussed followup and return precautions, pt indicates understanding and agreement with plan, ready for discharge.

## 2022-01-13 NOTE — ED PROVIDER NOTE - NSFOLLOWUPCLINICS_GEN_ALL_ED_FT
[facility]  Trauma Surgery  [address] [place] [address]  Phone: [phone]  Fax:   Follow Up Time: 4-6 Days

## 2022-01-13 NOTE — ED PROVIDER NOTE - CARE PROVIDER_API CALL
Donnie Ramesh)  Geriatric Medicine; Internal Medicine  [address] [place] [address]  Phone: [phone]  Fax: [phone]  Established Patient  Follow Up Time: Routine

## 2022-01-13 NOTE — ED PROVIDER NOTE - NSFOLLOWUPINSTRUCTIONS_ED_ALL_ED_FT
PLEASE HAVE YOUR SUTURES REMOVED FROM YOUR FOREHEAD LACERATION IN FIVE (5) DAYS IN ANY EMERGENCY DEPARTMENT, THE TRAUMA CLINIC, OR YOUR PRIMARY CARE PROVIDER'S OFFICE. THANK YOU.    --------------------------    Laceration    A laceration is a cut that goes through all of the layers of the skin and into the tissue that is right under the skin. Some lacerations heal on their own. Others need to be closed with stitches (sutures), staples, skin adhesive strips, or skin glue. Proper laceration care minimizes the risk of infection and helps the laceration to heal better.     SEEK IMMEDIATE MEDICAL CARE IF YOU HAVE THE FOLLOWING SYMPTOMS: swelling around the wound, worsening pain, drainage from the wound, red streaking going away from your wound, inability to move finger or toe near the laceration, or discoloration of skin near the laceration.     ----------------    Fall Prevention in the Home, Adult  Falls can cause injuries and can affect people from all age groups. There are many simple things that you can do to make your home safe and to help prevent falls. Ask for help when making these changes, if needed.    What actions can I take to prevent falls?  General instructions     Use good lighting in all rooms. Replace any light bulbs that burn out.  Turn on lights if it is dark. Use night-lights.  Place frequently used items in easy-to-reach places. Lower the shelves around your home if necessary.  Set up furniture so that there are clear paths around it. Avoid moving your furniture around.  Remove throw rugs and other tripping hazards from the floor.  Avoid walking on wet floors.  Fix any uneven floor surfaces.  Add color or contrast paint or tape to grab bars and handrails in your home. Place contrasting color strips on the first and last steps of stairways.  When you use a stepladder, make sure that it is completely opened and that the sides are firmly locked. Have someone hold the ladder while you are using it. Do not climb a closed stepladder.  Be aware of any and all pets.  What can I do in the bathroom?     Keep the floor dry. Immediately clean up any water that spills onto the floor.  Remove soap buildup in the tub or shower on a regular basis.  Use non-skid mats or decals on the floor of the tub or shower.  Attach bath mats securely with double-sided, non-slip rug tape.  If you need to sit down while you are in the shower, use a plastic, non-slip stool.  Image ImageInstall grab bars by the toilet and in the tub and shower. Do not use towel bars as grab bars.  What can I do in the bedroom?     Make sure that a bedside light is easy to reach.  Do not use oversized bedding that drapes onto the floor.  Have a firm chair that has side arms to use for getting dressed.  What can I do in the kitchen?     Clean up any spills right away.  If you need to reach for something above you, use a sturdy step stool that has a grab bar.  Keep electrical cables out of the way.  Do not use floor polish or wax that makes floors slippery. If you must use wax, make sure that it is non-skid floor wax.  What can I do in the stairways?     Do not leave any items on the stairs.  Make sure that you have a light switch at the top of the stairs and the bottom of the stairs. Have them installed if you do not have them.  Make sure that there are handrails on both sides of the stairs. Fix handrails that are broken or loose. Make sure that handrails are as long as the stairways.  Install non-slip stair treads on all stairs in your home.  Avoid having throw rugs at the top or bottom of stairways, or secure the rugs with carpet tape to prevent them from moving.  Choose a carpet design that does not hide the edge of steps on the stairway.  Check any carpeting to make sure that it is firmly attached to the stairs. Fix any carpet that is loose or worn.  What can I do on the outside of my home?     Use bright outdoor lighting.  Regularly repair the edges of walkways and driveways and fix any cracks.  Remove high doorway thresholds.  Trim any shrubbery on the main path into your home.  Regularly check that handrails are securely fastened and in good repair. Both sides of any steps should have handrails.  Install guardrails along the edges of any raised decks or porches.  Clear walkways of debris and clutter, including tools and rocks.  Have leaves, snow, and ice cleared regularly.  Use sand or salt on walkways during winter months.  In the garage, clean up any spills right away, including grease or oil spills.  What other actions can I take?     Wear closed-toe shoes that fit well and support your feet. Wear shoes that have rubber soles or low heels.  Use mobility aids as needed, such as canes, walkers, scooters, and crutches.  Review your medicines with your health care provider. Some medicines can cause dizziness or changes in blood pressure, which increase your risk of falling.  Talk with your health care provider about other ways that you can decrease your risk of falls. This may include working with a physical therapist or  to improve your strength, balance, and endurance.    Where to find more information  Centers for Disease Control and Prevention, TMAMIE: https://www.cdc.gov  National [place] on Aging: https://gj1izzh.олег.nih.gov  Contact a health care provider if:  You are afraid of falling at home.  You feel weak, drowsy, or dizzy at home.  You fall at home.  Summary  There are many simple things that you can do to make your home safe and to help prevent falls.  Ways to make your home safe include removing tripping hazards and installing grab bars in the bathroom.  Ask for help when making these changes in your home.  This information is not intended to replace advice given to you by your health care provider. Make sure you discuss any questions you have with your health care provider.

## 2022-01-13 NOTE — ED PROVIDER NOTE - CARE PLAN
1 Principal Discharge DX:	Fall from standing, initial encounter  Secondary Diagnosis:	Forehead laceration, initial encounter

## 2022-01-13 NOTE — ED PROVIDER NOTE - PHYSICAL EXAMINATION
CONSTITUTIONAL:  NAD  SKIN:  + 5cm linear laceration vertically oriented to midline forehead with minimal oozing, no active pulsatile bleeding, no FB; otherwise skin intact, warm, dry  HEAD:  NCAT except as previously mentioned  EYES:  NL inspection  ENT:  MMM  NECK:  supple; no c-spine tenderness to palpation  CARD:  RRR  RESP:  CTAB  ABD:  S/NT, no R/G  MSK:  no pedal edema  NEURO:  grossly unremarkable  PSYCH:  cooperative, appropriate

## 2022-01-13 NOTE — ED PROVIDER NOTE - OBJECTIVE STATEMENT
Pt is a 71M w/ a PMHx of HTN, Afib on xarelto presenting as a Trauma Alert s/p mechanical fall. Pt fell on his steps at home. +HT, -LOC, +AC (xarelto). Denies CP, SOB, abdominal pain, weakness/numbness.

## 2022-01-13 NOTE — ED PROVIDER NOTE - PATIENT PORTAL LINK FT
You can access the FollowMyHealth Patient Portal offered by [facility] by registering at the following website: [email or web address]. By joining [employer]’s FollowMyHealth portal, you will also be able to view your health information using other applications (apps) compatible with our system.

## 2022-01-13 NOTE — ED PROVIDER NOTE - CLINICAL SUMMARY MEDICAL DECISION MAKING FREE TEXT BOX
72 yo man with mechanical fall while walking up the stairs and slipping.  Denied drinking despite clearly having conjunctival injection, slurred speech and disconjugate gaze.  Patient is on anticoagulation.  Tetanus Updated.  Trauma alert called.  Pan CT scans ordered.  Patient improved clinically.  IV thiamine given.  Laceration repaired by trauma team.  Stable for discharge and outpatient follow up.

## 2022-01-23 ENCOUNTER — RX RENEWAL (OUTPATIENT)
Age: 72
End: 2022-01-23

## 2022-01-26 ENCOUNTER — NON-APPOINTMENT (OUTPATIENT)
Age: 72
End: 2022-01-26

## 2022-01-26 ENCOUNTER — APPOINTMENT (OUTPATIENT)
Dept: CARDIOLOGY | Facility: CLINIC | Age: 72
End: 2022-01-26
Payer: MEDICARE

## 2022-01-26 PROCEDURE — 93298 REM INTERROG DEV EVAL SCRMS: CPT

## 2022-01-26 PROCEDURE — G2066: CPT

## 2022-03-02 ENCOUNTER — APPOINTMENT (OUTPATIENT)
Dept: CARDIOLOGY | Facility: CLINIC | Age: 72
End: 2022-03-02
Payer: MEDICARE

## 2022-03-02 ENCOUNTER — NON-APPOINTMENT (OUTPATIENT)
Age: 72
End: 2022-03-02

## 2022-03-02 PROCEDURE — 93298 REM INTERROG DEV EVAL SCRMS: CPT

## 2022-03-02 PROCEDURE — G2066: CPT

## 2022-03-04 ENCOUNTER — LABORATORY RESULT (OUTPATIENT)
Age: 72
End: 2022-03-04

## 2022-03-08 ENCOUNTER — RESULT CHARGE (OUTPATIENT)
Age: 72
End: 2022-03-08

## 2022-03-08 ENCOUNTER — APPOINTMENT (OUTPATIENT)
Dept: CARDIOLOGY | Facility: CLINIC | Age: 72
End: 2022-03-08
Payer: MEDICARE

## 2022-03-08 VITALS
HEIGHT: 73 IN | WEIGHT: 315 LBS | BODY MASS INDEX: 41.75 KG/M2 | DIASTOLIC BLOOD PRESSURE: 76 MMHG | TEMPERATURE: 97.8 F | SYSTOLIC BLOOD PRESSURE: 124 MMHG

## 2022-03-08 PROCEDURE — 99213 OFFICE O/P EST LOW 20 MIN: CPT

## 2022-03-08 PROCEDURE — 93000 ELECTROCARDIOGRAM COMPLETE: CPT

## 2022-04-06 ENCOUNTER — APPOINTMENT (OUTPATIENT)
Dept: CARDIOLOGY | Facility: CLINIC | Age: 72
End: 2022-04-06
Payer: MEDICARE

## 2022-04-06 ENCOUNTER — NON-APPOINTMENT (OUTPATIENT)
Age: 72
End: 2022-04-06

## 2022-04-06 PROCEDURE — G2066: CPT

## 2022-04-06 PROCEDURE — 93298 REM INTERROG DEV EVAL SCRMS: CPT

## 2022-05-11 ENCOUNTER — NON-APPOINTMENT (OUTPATIENT)
Age: 72
End: 2022-05-11

## 2022-05-11 ENCOUNTER — APPOINTMENT (OUTPATIENT)
Dept: CARDIOLOGY | Facility: CLINIC | Age: 72
End: 2022-05-11
Payer: MEDICARE

## 2022-05-11 PROCEDURE — G2066: CPT

## 2022-05-11 PROCEDURE — 93298 REM INTERROG DEV EVAL SCRMS: CPT

## 2022-05-27 ENCOUNTER — RX RENEWAL (OUTPATIENT)
Age: 72
End: 2022-05-27

## 2022-06-14 ENCOUNTER — APPOINTMENT (OUTPATIENT)
Dept: CARDIOLOGY | Facility: CLINIC | Age: 72
End: 2022-06-14
Payer: MEDICARE

## 2022-06-14 ENCOUNTER — NON-APPOINTMENT (OUTPATIENT)
Age: 72
End: 2022-06-14

## 2022-06-14 PROCEDURE — 93298 REM INTERROG DEV EVAL SCRMS: CPT

## 2022-06-14 PROCEDURE — G2066: CPT

## 2022-06-21 ENCOUNTER — APPOINTMENT (OUTPATIENT)
Dept: CARDIOLOGY | Facility: CLINIC | Age: 72
End: 2022-06-21
Payer: MEDICARE

## 2022-06-21 PROCEDURE — 99213 OFFICE O/P EST LOW 20 MIN: CPT

## 2022-06-21 NOTE — PROCEDURE
[de-identified] : [employer] Reveal LINQ [de-identified] : [id number] [de-identified] : [id number] [de-identified] : 10/13/2020 [de-identified] : no event

## 2022-06-21 NOTE — HISTORY OF PRESENT ILLNESS
[de-identified] : Cardiologist: Dr. Healy\par 71 years old admitted to [facility] on 10/11/2020 for syncope  and fall. PMH includes HTN, afib on xarelto and multaq , has intermittent dizziness, syncope.\par \par Patient sustained acute fractures involving C2, T1, T2 and T3\par Nuero referred to EP -An ILR was implanted  on 10/13/2020 for long term monitoring for syncope \par \par Presents for post implant f/u; wound check and device interrogation\par \par \par Denies any sob, RUST, PND, orthopnea, no palpitations, no dizziness,lightheadedness, denies chest pains\par ECG ( 11/18/2020) 64bpm sinus rhythm \par ECHO ( 10/12/2020) EF 55-65%

## 2022-07-25 ENCOUNTER — NON-APPOINTMENT (OUTPATIENT)
Age: 72
End: 2022-07-25

## 2022-07-26 ENCOUNTER — APPOINTMENT (OUTPATIENT)
Dept: CARDIOLOGY | Facility: CLINIC | Age: 72
End: 2022-07-26

## 2022-07-26 PROCEDURE — 93298 REM INTERROG DEV EVAL SCRMS: CPT

## 2022-07-26 PROCEDURE — G2066: CPT

## 2022-08-01 ENCOUNTER — LABORATORY RESULT (OUTPATIENT)
Age: 72
End: 2022-08-01

## 2022-08-03 ENCOUNTER — APPOINTMENT (OUTPATIENT)
Dept: CARDIOLOGY | Facility: CLINIC | Age: 72
End: 2022-08-03

## 2022-08-03 VITALS
BODY MASS INDEX: 41.75 KG/M2 | HEIGHT: 73 IN | DIASTOLIC BLOOD PRESSURE: 78 MMHG | SYSTOLIC BLOOD PRESSURE: 128 MMHG | WEIGHT: 315 LBS

## 2022-08-03 PROCEDURE — 99214 OFFICE O/P EST MOD 30 MIN: CPT

## 2022-08-03 PROCEDURE — 93000 ELECTROCARDIOGRAM COMPLETE: CPT

## 2022-08-11 ENCOUNTER — OUTPATIENT (OUTPATIENT)
Dept: OUTPATIENT SERVICES | Facility: HOSPITAL | Age: 72
LOS: 1 days | Discharge: HOME | End: 2022-08-11

## 2022-08-11 ENCOUNTER — RESULT REVIEW (OUTPATIENT)
Age: 72
End: 2022-08-11

## 2022-08-11 DIAGNOSIS — R06.02 SHORTNESS OF BREATH: ICD-10-CM

## 2022-08-11 DIAGNOSIS — Z98.890 OTHER SPECIFIED POSTPROCEDURAL STATES: Chronic | ICD-10-CM

## 2022-08-11 DIAGNOSIS — Z09 ENCOUNTER FOR FOLLOW-UP EXAMINATION AFTER COMPLETED TREATMENT FOR CONDITIONS OTHER THAN MALIGNANT NEOPLASM: ICD-10-CM

## 2022-08-11 DIAGNOSIS — R07.9 CHEST PAIN, UNSPECIFIED: ICD-10-CM

## 2022-08-11 DIAGNOSIS — M84.371A STRESS FRACTURE, RIGHT ANKLE, INITIAL ENCOUNTER FOR FRACTURE: Chronic | ICD-10-CM

## 2022-08-11 PROCEDURE — 78452 HT MUSCLE IMAGE SPECT MULT: CPT | Mod: 26,MH

## 2022-08-30 ENCOUNTER — NON-APPOINTMENT (OUTPATIENT)
Age: 72
End: 2022-08-30

## 2022-08-30 ENCOUNTER — APPOINTMENT (OUTPATIENT)
Dept: CARDIOLOGY | Facility: CLINIC | Age: 72
End: 2022-08-30

## 2022-08-30 PROCEDURE — 93298 REM INTERROG DEV EVAL SCRMS: CPT

## 2022-08-30 PROCEDURE — G2066: CPT

## 2022-10-04 ENCOUNTER — APPOINTMENT (OUTPATIENT)
Dept: CARDIOLOGY | Facility: CLINIC | Age: 72
End: 2022-10-04

## 2022-10-04 ENCOUNTER — NON-APPOINTMENT (OUTPATIENT)
Age: 72
End: 2022-10-04

## 2022-10-04 PROCEDURE — 93298 REM INTERROG DEV EVAL SCRMS: CPT

## 2022-10-04 PROCEDURE — G2066: CPT

## 2022-11-08 ENCOUNTER — APPOINTMENT (OUTPATIENT)
Dept: CARDIOLOGY | Facility: CLINIC | Age: 72
End: 2022-11-08

## 2022-11-08 ENCOUNTER — NON-APPOINTMENT (OUTPATIENT)
Age: 72
End: 2022-11-08

## 2022-11-08 PROCEDURE — G2066: CPT

## 2022-11-08 PROCEDURE — 93298 REM INTERROG DEV EVAL SCRMS: CPT

## 2022-11-22 NOTE — H&P ADULT - NSCORESITESY/N_GEN_A_CORE_RD
[Duration of Psychotherapy Visit (minutes spent in synchronous communication): ____] : Duration of Psychotherapy Visit (minutes spent in synchronous communication): [unfilled] [Individual Psychotherapy for 53+ minutes] : Individual Psychotherapy for 53+ minutes  [Licensed Clinician] : Licensed Clinician No

## 2022-11-28 LAB
ALBUMIN SERPL ELPH-MCNC: 4.1 G/DL
ALP BLD-CCNC: 119 U/L
ALT SERPL-CCNC: 12 U/L
ANION GAP SERPL CALC-SCNC: 14 MMOL/L
AST SERPL-CCNC: 14 U/L
BILIRUB DIRECT SERPL-MCNC: <0.2 MG/DL
BILIRUB INDIRECT SERPL-MCNC: NORMAL MG/DL
BILIRUB SERPL-MCNC: 0.7 MG/DL
BUN SERPL-MCNC: 11 MG/DL
CALCIUM SERPL-MCNC: 9 MG/DL
CHLORIDE SERPL-SCNC: 101 MMOL/L
CHOLEST SERPL-MCNC: 105 MG/DL
CO2 SERPL-SCNC: 25 MMOL/L
CREAT SERPL-MCNC: 0.9 MG/DL
EGFR: 91 ML/MIN/1.73M2
GLUCOSE SERPL-MCNC: 95 MG/DL
HDLC SERPL-MCNC: 45 MG/DL
LDLC SERPL CALC-MCNC: 48 MG/DL
NONHDLC SERPL-MCNC: 60 MG/DL
POTASSIUM SERPL-SCNC: 4 MMOL/L
PROT SERPL-MCNC: 6.6 G/DL
SODIUM SERPL-SCNC: 140 MMOL/L
TRIGL SERPL-MCNC: 62 MG/DL

## 2022-11-30 ENCOUNTER — APPOINTMENT (OUTPATIENT)
Dept: CARDIOLOGY | Facility: CLINIC | Age: 72
End: 2022-11-30

## 2022-11-30 VITALS
DIASTOLIC BLOOD PRESSURE: 80 MMHG | BODY MASS INDEX: 41.75 KG/M2 | TEMPERATURE: 98 F | HEIGHT: 73 IN | WEIGHT: 315 LBS | OXYGEN SATURATION: 94 % | HEART RATE: 58 BPM | SYSTOLIC BLOOD PRESSURE: 130 MMHG

## 2022-11-30 DIAGNOSIS — I34.0 NONRHEUMATIC MITRAL (VALVE) INSUFFICIENCY: ICD-10-CM

## 2022-11-30 PROCEDURE — 93000 ELECTROCARDIOGRAM COMPLETE: CPT

## 2022-11-30 PROCEDURE — 99214 OFFICE O/P EST MOD 30 MIN: CPT

## 2022-11-30 RX ORDER — RIVAROXABAN 20 MG/1
20 TABLET, FILM COATED ORAL
Qty: 90 | Refills: 3 | Status: DISCONTINUED | COMMUNITY
Start: 2020-10-06 | End: 2022-11-30

## 2022-11-30 NOTE — PHYSICAL EXAM
[Well Developed] : well developed [Well Nourished] : well nourished [No Acute Distress] : no acute distress [Obese] : obese [Normal Conjunctiva] : normal conjunctiva [Normal Venous Pressure] : normal venous pressure [No Carotid Bruit] : no carotid bruit [Normal S1, S2] : normal S1, S2 [No Murmur] : no murmur [No Rub] : no rub [No Gallop] : no gallop [Clear Lung Fields] : clear lung fields [Good Air Entry] : good air entry [No Respiratory Distress] : no respiratory distress  [Soft] : abdomen soft [Non Tender] : non-tender [No Masses/organomegaly] : no masses/organomegaly [Normal Bowel Sounds] : normal bowel sounds [Normal Gait] : normal gait [No Edema] : no edema [No Cyanosis] : no cyanosis [No Clubbing] : no clubbing [No Varicosities] : no varicosities [No Rash] : no rash [No Skin Lesions] : no skin lesions [Moves all extremities] : moves all extremities [No Focal Deficits] : no focal deficits [Normal Speech] : normal speech [Alert and Oriented] : alert and oriented [Normal memory] : normal memory [de-identified] : Large neck

## 2022-11-30 NOTE — ASSESSMENT
[FreeTextEntry1] : Mr. Tao is a 72-year-old man with history of paroxysmal AF, syncope s/p ILR implant 10/2020, PFO, HTN, HLD, and MAY presenting for follow up.\par \par Impression:\par (1) Paroxysmal AF, CHADSVASc at least 2 (Age, HTN), on AC but cannot afford it, in NSR, stable\par (2) Syncope, s/p ILR 10/2020, no events thus far, following with EP\par (3) HTN, well controlled\par (4) HLD, well controlled\par (5) Class III obesity\par (6) MAY\par \par Plan:\par - Continue AC, switch to Apixaban as Xarelto is too expensive for him\par - Continue Multaq for now\par - Continue metoprolol 50mg BID\par - Continue atorvastatin 40mg\par - Continue benazepril 40mg daily\par - Repeat TTE prior to next visit to reassess moderate MRAnton\par \par RTC 6 months, sooner as needed.

## 2022-12-13 ENCOUNTER — NON-APPOINTMENT (OUTPATIENT)
Age: 72
End: 2022-12-13

## 2022-12-13 ENCOUNTER — APPOINTMENT (OUTPATIENT)
Dept: CARDIOLOGY | Facility: CLINIC | Age: 72
End: 2022-12-13

## 2022-12-13 PROCEDURE — 93298 REM INTERROG DEV EVAL SCRMS: CPT

## 2022-12-13 PROCEDURE — G2066: CPT

## 2022-12-15 ENCOUNTER — NON-APPOINTMENT (OUTPATIENT)
Age: 72
End: 2022-12-15

## 2023-01-16 ENCOUNTER — EMERGENCY (EMERGENCY)
Facility: HOSPITAL | Age: 73
LOS: 0 days | Discharge: HOME | End: 2023-01-16
Attending: EMERGENCY MEDICINE | Admitting: EMERGENCY MEDICINE
Payer: MEDICARE

## 2023-01-16 VITALS
TEMPERATURE: 96 F | OXYGEN SATURATION: 98 % | DIASTOLIC BLOOD PRESSURE: 82 MMHG | SYSTOLIC BLOOD PRESSURE: 170 MMHG | HEART RATE: 56 BPM | RESPIRATION RATE: 20 BRPM

## 2023-01-16 VITALS
SYSTOLIC BLOOD PRESSURE: 199 MMHG | HEIGHT: 73 IN | OXYGEN SATURATION: 99 % | HEART RATE: 68 BPM | WEIGHT: 315 LBS | RESPIRATION RATE: 18 BRPM | TEMPERATURE: 96 F | DIASTOLIC BLOOD PRESSURE: 91 MMHG

## 2023-01-16 DIAGNOSIS — Z79.01 LONG TERM (CURRENT) USE OF ANTICOAGULANTS: ICD-10-CM

## 2023-01-16 DIAGNOSIS — E78.5 HYPERLIPIDEMIA, UNSPECIFIED: ICD-10-CM

## 2023-01-16 DIAGNOSIS — Z85.048 PERSONAL HISTORY OF OTHER MALIGNANT NEOPLASM OF RECTUM, RECTOSIGMOID JUNCTION, AND ANUS: ICD-10-CM

## 2023-01-16 DIAGNOSIS — I48.91 UNSPECIFIED ATRIAL FIBRILLATION: ICD-10-CM

## 2023-01-16 DIAGNOSIS — Z98.890 OTHER SPECIFIED POSTPROCEDURAL STATES: Chronic | ICD-10-CM
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
## 2023-01-16 DIAGNOSIS — I10 ESSENTIAL (PRIMARY) HYPERTENSION: ICD-10-CM

## 2023-01-16 DIAGNOSIS — M84.371A STRESS FRACTURE, RIGHT ANKLE, INITIAL ENCOUNTER FOR FRACTURE: Chronic | ICD-10-CM

## 2023-01-16 DIAGNOSIS — L97.919 NON-PRESSURE CHRONIC ULCER OF UNSPECIFIED PART OF RIGHT LOWER LEG WITH UNSPECIFIED SEVERITY: ICD-10-CM
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
## 2023-01-16 DIAGNOSIS — I87.8 OTHER SPECIFIED DISORDERS OF VEINS: ICD-10-CM

## 2023-01-16 LAB
ALBUMIN SERPL ELPH-MCNC: 3.6 G/DL — SIGNIFICANT CHANGE UP (ref 3.5–5.2)
ALP SERPL-CCNC: 112 U/L — SIGNIFICANT CHANGE UP (ref 30–115)
ALT FLD-CCNC: 13 U/L — SIGNIFICANT CHANGE UP (ref 0–41)
ANION GAP SERPL CALC-SCNC: 8 MMOL/L — SIGNIFICANT CHANGE UP (ref 7–14)
AST SERPL-CCNC: 16 U/L — SIGNIFICANT CHANGE UP (ref 0–41)
BASOPHILS # BLD AUTO: 0.04 K/UL — SIGNIFICANT CHANGE UP (ref 0–0.2)
BASOPHILS NFR BLD AUTO: 0.6 % — SIGNIFICANT CHANGE UP (ref 0–1)
BILIRUB SERPL-MCNC: 0.5 MG/DL — SIGNIFICANT CHANGE UP (ref 0.2–1.2)
BUN SERPL-MCNC: 11 MG/DL — SIGNIFICANT CHANGE UP (ref 10–20)
CALCIUM SERPL-MCNC: 8.7 MG/DL — SIGNIFICANT CHANGE UP (ref 8.4–10.5)
CHLORIDE SERPL-SCNC: 104 MMOL/L — SIGNIFICANT CHANGE UP (ref 98–110)
CO2 SERPL-SCNC: 27 MMOL/L — SIGNIFICANT CHANGE UP (ref 17–32)
CREAT SERPL-MCNC: 0.9 MG/DL — SIGNIFICANT CHANGE UP (ref 0.7–1.5)
EGFR: 91 ML/MIN/1.73M2 — SIGNIFICANT CHANGE UP
EOSINOPHIL # BLD AUTO: 0.12 K/UL — SIGNIFICANT CHANGE UP (ref 0–0.7)
EOSINOPHIL NFR BLD AUTO: 1.8 % — SIGNIFICANT CHANGE UP (ref 0–8)
GLUCOSE SERPL-MCNC: 132 MG/DL — HIGH (ref 70–99)
HCT VFR BLD CALC: 38.4 % — LOW (ref 42–52)
HGB BLD-MCNC: 13.1 G/DL — LOW (ref 14–18)
IMM GRANULOCYTES NFR BLD AUTO: 0.2 % — SIGNIFICANT CHANGE UP (ref 0.1–0.3)
LYMPHOCYTES # BLD AUTO: 0.81 K/UL — LOW (ref 1.2–3.4)
LYMPHOCYTES # BLD AUTO: 12.4 % — LOW (ref 20.5–51.1)
MCHC RBC-ENTMCNC: 28.7 PG — SIGNIFICANT CHANGE UP (ref 27–31)
MCHC RBC-ENTMCNC: 34.1 G/DL — SIGNIFICANT CHANGE UP (ref 32–37)
MCV RBC AUTO: 84.2 FL — SIGNIFICANT CHANGE UP (ref 80–94)
MONOCYTES # BLD AUTO: 0.5 K/UL — SIGNIFICANT CHANGE UP (ref 0.1–0.6)
MONOCYTES NFR BLD AUTO: 7.7 % — SIGNIFICANT CHANGE UP (ref 1.7–9.3)
NEUTROPHILS # BLD AUTO: 5.04 K/UL — SIGNIFICANT CHANGE UP (ref 1.4–6.5)
NEUTROPHILS NFR BLD AUTO: 77.3 % — HIGH (ref 42.2–75.2)
NRBC # BLD: 0 /100 WBCS — SIGNIFICANT CHANGE UP (ref 0–0)
PLATELET # BLD AUTO: 213 K/UL — SIGNIFICANT CHANGE UP (ref 130–400)
POTASSIUM SERPL-MCNC: 4.1 MMOL/L — SIGNIFICANT CHANGE UP (ref 3.5–5)
POTASSIUM SERPL-SCNC: 4.1 MMOL/L — SIGNIFICANT CHANGE UP (ref 3.5–5)
PROT SERPL-MCNC: 6.4 G/DL — SIGNIFICANT CHANGE UP (ref 6–8)
RBC # BLD: 4.56 M/UL — LOW (ref 4.7–6.1)
RBC # FLD: 13.1 % — SIGNIFICANT CHANGE UP (ref 11.5–14.5)
SODIUM SERPL-SCNC: 139 MMOL/L — SIGNIFICANT CHANGE UP (ref 135–146)
WBC # BLD: 6.52 K/UL — SIGNIFICANT CHANGE UP (ref 4.8–10.8)
WBC # FLD AUTO: 6.52 K/UL — SIGNIFICANT CHANGE UP (ref 4.8–10.8)

## 2023-01-16 PROCEDURE — 99284 EMERGENCY DEPT VISIT MOD MDM: CPT | Mod: GC

## 2023-01-16 PROCEDURE — 73610 X-RAY EXAM OF ANKLE: CPT | Mod: 26,RT

## 2023-01-16 RX ORDER — CEFAZOLIN SODIUM 1 G
2000 VIAL (EA) INJECTION ONCE
Refills: 0 | Status: COMPLETED | OUTPATIENT
Start: 2023-01-16 | End: 2023-01-16

## 2023-01-16 RX ORDER — CEPHALEXIN 500 MG
1 CAPSULE ORAL
Qty: 30 | Refills: 0
Start: 2023-01-16 | End: 2023-01-25

## 2023-01-16 RX ADMIN — Medication 100 MILLIGRAM(S): at 13:14

## 2023-01-16 NOTE — ED PROVIDER NOTE - PHYSICAL EXAMINATION
_  CONSTITUTIONAL: NAD  SKIN: Warm, dry; +wound on anterior right lower shin without purulent discharge; +popped blister on medial surface of right lower leg  HEAD: NCAT  EYES: Clear conjunctiva   ENT: MMM  NECK: Supple  CARD: RRR, S1, S2; no M/R/G  RESP: Speaking in full sentences; CTAB  ABD: S/NT, no R/G  EXT: Pulses palpable distally; +B/L edema with chronic venous statis dermatitis changes; RLE without bony tenderness or crepitus; DP 2+  NEURO: Grossly intact  PSYCH: Cooperative, appropriate

## 2023-01-16 NOTE — ED PROVIDER NOTE - OBJECTIVE STATEMENT
Patient is a 72-year-old male with a history of hypertension, rectal CA, in remission, hyperlipidemia, atrial fibrillation on Xarelto, right ankle fracture, status post internal fixation in 2013, presenting for wound evaluation on right lower leg. Patient was working in his garage eight days ago, and accidentally struck his right lower shin against an object, causing a wound. He has been keeping it bandaged, clean and dry. A few days later, he noticed a blister nearby, on the medial surface of the same ankle. Patient has chronic venous stasis dermatitis, causing the wounds to heal longer on his legs. No fever or purulent discharge from the wounds.

## 2023-01-16 NOTE — ED PROVIDER NOTE - CLINICAL SUMMARY MEDICAL DECISION MAKING FREE TEXT BOX
Labs obtained.  White blood cell count is normal.  Patient treated with antibiotics here.  X-ray without gas or osteomyelitis.  Wound care performed.  Patient to continue oral antibiotics.  Patient to continue wound care at home.  Patient to follow-up PMD.  Also recommend follow-up with burn for further wound management.  Patient verbalizes understanding    Return if any worsening symptoms or concerns

## 2023-01-16 NOTE — ED PROVIDER NOTE - CARE PROVIDERS DIRECT ADDRESSES
[email or web address]

## 2023-01-16 NOTE — ED PROVIDER NOTE - PATIENT PORTAL LINK FT
You can access the FollowMyHealth Patient Portal offered by [facility] by registering at the following website: [email or web address]. By joining [email or web address]’s FollowMyHealth portal, you will also be able to view your health information using other applications (apps) compatible with our system.

## 2023-01-16 NOTE — ED ADULT NURSE NOTE - OBJECTIVE STATEMENT
bp better than yesterday   Will continue lisinopril and monitor vitals  Coreg listed on his home med lsit but patient is not taking it     pt stated last week he was working in the garage, bumped his leg and a blister formed  blister popped open on thursday  pt has redness and drainage to lower leg   open wound present to right lower leg  pt denies pain or fevers

## 2023-01-16 NOTE — ED PROVIDER NOTE - ATTENDING CONTRIBUTION TO CARE
71 yo M PMHx noted presents for evaluation of wound to RLE.  Pt states last week he hit his ankle on something while working in the garage.  Pt has been keeping area clean and bandaged, but feels that it is not getting better and also noticed that he developed a blister near the wound, no fevers, On exam pt in NAD AAO x 3, + wound to rt lateral ankle with surrounding erythema and warmth, no drainage, + granulation tissue, + skin breakdown to medial ankle. + DP pulses, no calf tenderness, no streaking 73 yo M PMHx noted presents for evaluation of wound to RLE.  Pt states last week he hit his ankle on something while working in the garage.  Pt has been keeping area clean and bandaged, but feels that it is not getting better and also noticed that he developed a blister near the wound, no fevers, On exam pt in NAD AAO x 3, + wound to rt lateral ankle with surrounding erythema and warmth, no drainage, + granulation tissue, + skin breakdown to medial ankle. + DP pulses, + hyperpigmentation to skin b/l LE, no calf tenderness, no streaking

## 2023-01-16 NOTE — ED PROVIDER NOTE - CARE PROVIDER_API CALL
Domenic Li)  Geriatric Medicine; Internal Medicine  [address] [place] [address]  Phone: [phone]  Fax: [phone]  Established Patient  Follow Up Time: Urgent

## 2023-01-16 NOTE — ED PROVIDER NOTE - NSFOLLOWUPINSTRUCTIONS_ED_ALL_ED_FT
You were evaluated in the Emergency Department today, and your visit did not reveal anything immediately life-threatening.    However, it is important that you follow-up with your PRIMARY CARE PHYSICIAN within 3-5 days.    Additionally, it is important that you follow-up with BURN CLINIC (experts at managing wounds): [facility]  [address], [address]  [place] [address]  Phone: [phone]    ---------------------------------------------------------------------------------------------------    Antibiotics were sent to your pharmacy.    ---------------------------------------------------------------------------------------------------    WOUND CARE, ADULT    Taking care of your wound properly can help to prevent pain, infection, and scarring. It can also help your wound heal more quickly. Follow instructions from your health care provider about how to care for your wound.    Cleaning the wound:   - Use non-scented soap and water, a wound cleanser, saline, or sterile water.   - Pat the wound dry after cleaning it. Do not rub or scrub the wound.    If you were advised to have a dressing:    - Wash your hands with soap and water for at least 20 seconds before and after you change the dressing. If soap and water are not available, use hand .   - Change your dressing as told by your health care provider. This may include:   -- Cleaning or rinsing out (irrigating) the wound.   -- Application of cream or topical ointment, if told by your health care provider.   -- Placing a dressing over the wound or in the wound (packing).   -- Covering the wound with an outer dressing.   - Ask your health care provider when you can leave the wound uncovered.    Checking for infection  Check your wound area every day for signs of infection. Check for:   - More redness, swelling, or pain.   - Fluid or blood.   - Warmth.   - Pus or a bad smell.    Follow these instructions at home  Medicines    - If you were prescribed an antibiotic medicine, cream, or ointment, take or apply it as told by your health care provider. Do not stop using the antibiotic even if your condition improves.   - If you were prescribed pain medicine, take it 30 minutes before you do any wound care or as told by your health care provider.   - Take over-the-counter and prescription medicines only as told by your health care provider.    Eating and drinking    - Eat a diet that includes protein, vitamin A, vitamin C, and other nutrient-rich foods to help the wound heal.   - Foods rich in protein include meat, fish, eggs, dairy, beans, and nuts.   - Foods rich in vitamin A include carrots and dark green, leafy vegetables.   - Foods rich in vitamin C include citrus fruits, tomatoes, broccoli, and peppers.   - Drink enough fluid to keep your urine pale yellow.    General instructions    - Do not take baths, swim, or use a hot tub until your health care provider approves. Ask your health care provider if you may take showers. You may only be allowed to take sponge baths.   - Do not scratch or pick at the wound. Keep it covered as told by your health care provider.   - Return to your normal activities as told by your health care provider. Ask your health care provider what activities are safe for you.   - Protect your wound from the sun when you are outside for the first 6 months, or for as long as told by your health care provider. Cover up the scar area or apply sunscreen that has an SPF of at least 30.   - Do not use any products that contain nicotine or tobacco. These products include cigarettes, chewing tobacco, and vaping devices, such as e-cigarettes. If you need help quitting, ask your health care provider.   - Keep all follow-up visits. This is important.    Contact a health care provider if:   - You received a tetanus shot and you have swelling, severe pain, redness, or bleeding at the injection site.   - Your pain is not controlled with medicine.   - You have any of these signs of infection:   - More redness, swelling, or pain around the wound.   - Fluid or blood coming from the wound.   - Warmth coming from the wound.   - A fever or chills.   - You are nauseous or you vomit.   - You are dizzy.   - You have a new rash or hardness around the wound.    Get help right away if:   - You have a red streak of skin near the area around your wound.   - Pus or a bad smell coming from the wound.   - Your wound has been closed with staples, sutures, skin glue, or adhesive strips and it begins to open up and separate.   - Your wound is bleeding, and the bleeding does not stop with gentle pressure.  These symptoms may represent a serious problem that is an emergency. Do not wait to see if the symptoms will go away. Get medical help right away. Call your local emergency services (911 in the [place] Do not drive yourself to the hospital.     Summary   - Always wash your hands with soap and water for at least 20 seconds before and after changing your dressing.   - Change your dressing as told by your health care provider.   - To help with healing, eat foods that are rich in protein, vitamin A, vitamin C, and other nutrients.   - Check your wound every day for signs of infection. Contact your health care provider if you think that your wound is infected.    This information is not intended to replace advice given to you by your health care provider. Make sure you discuss any questions you have with your health care provider.    Document Revised: 04/26/2022 Document Reviewed: 04/26/2022  Elsevier Patient Education © 2022 [employer]

## 2023-01-16 NOTE — ED PROVIDER NOTE - NSFOLLOWUPCLINICS_GEN_ALL_ED_FT
[facility]  Burn  [address], [address]  [place] [address]  Phone: [phone]  Fax:   Follow Up Time: Urgent

## 2023-01-17 ENCOUNTER — APPOINTMENT (OUTPATIENT)
Dept: ELECTROPHYSIOLOGY | Facility: CLINIC | Age: 73
End: 2023-01-17
Payer: MEDICARE

## 2023-01-17 VITALS
HEART RATE: 64 BPM | WEIGHT: 315 LBS | SYSTOLIC BLOOD PRESSURE: 164 MMHG | DIASTOLIC BLOOD PRESSURE: 98 MMHG | HEIGHT: 73 IN | RESPIRATION RATE: 16 BRPM | BODY MASS INDEX: 41.75 KG/M2 | TEMPERATURE: 97.1 F

## 2023-01-17 DIAGNOSIS — Z82.49 FAMILY HISTORY OF ISCHEMIC HEART DISEASE AND OTHER DISEASES OF THE CIRCULATORY SYSTEM: ICD-10-CM

## 2023-01-17 DIAGNOSIS — Z87.891 PERSONAL HISTORY OF NICOTINE DEPENDENCE: ICD-10-CM

## 2023-01-17 DIAGNOSIS — R55 SYNCOPE AND COLLAPSE: ICD-10-CM

## 2023-01-17 PROCEDURE — 99214 OFFICE O/P EST MOD 30 MIN: CPT

## 2023-01-17 RX ORDER — APIXABAN 5 MG/1
5 TABLET, FILM COATED ORAL
Qty: 180 | Refills: 3 | Status: COMPLETED | COMMUNITY
Start: 2022-11-30 | End: 2023-01-17

## 2023-01-17 RX ORDER — AMMONIUM LACTATE 12 %
12 CREAM (GRAM) TOPICAL
Qty: 385 | Refills: 0 | Status: COMPLETED | COMMUNITY
Start: 2022-04-14 | End: 2023-01-17

## 2023-01-17 NOTE — HISTORY OF PRESENT ILLNESS
[de-identified] : Cardiologist: Dr. Healy\par 71 years old admitted to [facility] on 10/11/2020 for syncope  and fall. PMH includes HTN, afib on xarelto and multaq , has intermittent dizziness, syncope.\par \par Patient sustained acute fractures involving C2, T1, T2 and T3\par Nuero referred to EP -An ILR was implanted  on 10/13/2020 for long term monitoring for syncope \par \par Presents for post implant f/u; wound check and device interrogation\par \par \par Denies any sob, RUST, PND, orthopnea, no palpitations, no dizziness,lightheadedness, denies chest pains\par ECG ( 11/18/2020) 64bpm sinus rhythm \par ECHO ( 10/12/2020) EF 55-65%

## 2023-01-17 NOTE — PROCEDURE
[No] : not [None] : none [de-identified] : Medtronic [de-identified] : LINQ [de-identified] : [id number] [de-identified] : 10/13/2020 [de-identified] : No events.

## 2023-01-17 NOTE — ASSESSMENT
[FreeTextEntry1] : # syncope s/p ILR\par - no events on loop recorder\par - Pt is feeling well no s/s\par \par # Remote monitoring - patient is enrolled\par - Remote monitoring was discussed with patient, schedule, process, as well as associated co-pay that may not be covered by their insurance.\par \par # PAF\par - No events on monitor\par - Continue multaq 400 mg q12 and metoprolol 50 mg qd\par - Continue Xarelto. No bleeding. Creat 0.9 (11/25/22)\par \par \par \par \par RTO 6 months/PRN\par \par

## 2023-02-16 DIAGNOSIS — I47.2 VENTRICULAR TACHYCARDIA: ICD-10-CM

## 2023-02-21 ENCOUNTER — NON-APPOINTMENT (OUTPATIENT)
Age: 73
End: 2023-02-21

## 2023-02-21 ENCOUNTER — APPOINTMENT (OUTPATIENT)
Dept: CARDIOLOGY | Facility: CLINIC | Age: 73
End: 2023-02-21
Payer: MEDICARE

## 2023-02-21 PROCEDURE — G2066: CPT

## 2023-02-21 PROCEDURE — 93298 REM INTERROG DEV EVAL SCRMS: CPT

## 2023-03-24 ENCOUNTER — NON-APPOINTMENT (OUTPATIENT)
Age: 73
End: 2023-03-24

## 2023-03-24 ENCOUNTER — APPOINTMENT (OUTPATIENT)
Dept: CARDIOLOGY | Facility: CLINIC | Age: 73
End: 2023-03-24
Payer: MEDICARE

## 2023-03-24 PROCEDURE — 93298 REM INTERROG DEV EVAL SCRMS: CPT

## 2023-03-24 PROCEDURE — G2066: CPT

## 2023-04-22 ENCOUNTER — NON-APPOINTMENT (OUTPATIENT)
Age: 73
End: 2023-04-22

## 2023-04-26 ENCOUNTER — APPOINTMENT (OUTPATIENT)
Dept: CARDIOLOGY | Facility: CLINIC | Age: 73
End: 2023-04-26

## 2023-04-27 ENCOUNTER — APPOINTMENT (OUTPATIENT)
Dept: CARDIOLOGY | Facility: CLINIC | Age: 73
End: 2023-04-27
Payer: MEDICARE

## 2023-04-27 ENCOUNTER — NON-APPOINTMENT (OUTPATIENT)
Age: 73
End: 2023-04-27

## 2023-04-27 PROCEDURE — G2066: CPT

## 2023-04-27 PROCEDURE — 93298 REM INTERROG DEV EVAL SCRMS: CPT

## 2023-05-03 LAB
ANION GAP SERPL CALC-SCNC: 8 MMOL/L
BUN SERPL-MCNC: 15 MG/DL
CALCIUM SERPL-MCNC: 9.1 MG/DL
CHLORIDE SERPL-SCNC: 103 MMOL/L
CO2 SERPL-SCNC: 27 MMOL/L
CREAT SERPL-MCNC: 1 MG/DL
EGFR: 80 ML/MIN/1.73M2
GLUCOSE SERPL-MCNC: 78 MG/DL
POTASSIUM SERPL-SCNC: 4.6 MMOL/L
SODIUM SERPL-SCNC: 138 MMOL/L

## 2023-05-11 ENCOUNTER — APPOINTMENT (OUTPATIENT)
Dept: CARDIOLOGY | Facility: CLINIC | Age: 73
End: 2023-05-11
Payer: MEDICARE

## 2023-05-11 PROCEDURE — 93306 TTE W/DOPPLER COMPLETE: CPT

## 2023-06-01 ENCOUNTER — NON-APPOINTMENT (OUTPATIENT)
Age: 73
End: 2023-06-01

## 2023-06-01 ENCOUNTER — APPOINTMENT (OUTPATIENT)
Dept: CARDIOLOGY | Facility: CLINIC | Age: 73
End: 2023-06-01
Payer: MEDICARE

## 2023-06-01 PROCEDURE — 93298 REM INTERROG DEV EVAL SCRMS: CPT

## 2023-06-01 PROCEDURE — G2066: CPT

## 2023-06-09 ENCOUNTER — OUTPATIENT (OUTPATIENT)
Dept: OUTPATIENT SERVICES | Facility: HOSPITAL | Age: 73
LOS: 1 days | End: 2023-06-09
Payer: MEDICARE

## 2023-06-09 ENCOUNTER — RESULT REVIEW (OUTPATIENT)
Age: 73
End: 2023-06-09

## 2023-06-09 DIAGNOSIS — Z98.890 OTHER SPECIFIED POSTPROCEDURAL STATES: Chronic | ICD-10-CM

## 2023-06-09 DIAGNOSIS — R00.2 PALPITATIONS: ICD-10-CM

## 2023-06-09 DIAGNOSIS — M84.371A STRESS FRACTURE, RIGHT ANKLE, INITIAL ENCOUNTER FOR FRACTURE: Chronic | ICD-10-CM

## 2023-06-09 PROCEDURE — 75574 CT ANGIO HRT W/3D IMAGE: CPT

## 2023-06-09 PROCEDURE — 75574 CT ANGIO HRT W/3D IMAGE: CPT | Mod: 26,MH

## 2023-06-10 DIAGNOSIS — R00.2 PALPITATIONS: ICD-10-CM

## 2023-06-14 ENCOUNTER — APPOINTMENT (OUTPATIENT)
Dept: CARDIOLOGY | Facility: CLINIC | Age: 73
End: 2023-06-14
Payer: MEDICARE

## 2023-06-14 ENCOUNTER — OUTPATIENT (OUTPATIENT)
Dept: OUTPATIENT SERVICES | Facility: HOSPITAL | Age: 73
LOS: 1 days | End: 2023-06-14
Payer: MEDICARE

## 2023-06-14 VITALS
HEIGHT: 73 IN | BODY MASS INDEX: 41.75 KG/M2 | DIASTOLIC BLOOD PRESSURE: 88 MMHG | WEIGHT: 315 LBS | HEART RATE: 64 BPM | SYSTOLIC BLOOD PRESSURE: 150 MMHG

## 2023-06-14 DIAGNOSIS — Z98.890 OTHER SPECIFIED POSTPROCEDURAL STATES: Chronic | ICD-10-CM

## 2023-06-14 DIAGNOSIS — M84.371A STRESS FRACTURE, RIGHT ANKLE, INITIAL ENCOUNTER FOR FRACTURE: Chronic | ICD-10-CM

## 2023-06-14 DIAGNOSIS — I48.19 OTHER PERSISTENT ATRIAL FIBRILLATION: ICD-10-CM

## 2023-06-14 PROCEDURE — 99214 OFFICE O/P EST MOD 30 MIN: CPT

## 2023-06-14 PROCEDURE — 0502T: CPT

## 2023-06-14 PROCEDURE — 0503T: CPT

## 2023-06-14 PROCEDURE — 93000 ELECTROCARDIOGRAM COMPLETE: CPT

## 2023-06-14 NOTE — HISTORY OF PRESENT ILLNESS
[FreeTextEntry1] : Mr. Tao is a 72-year-old man with history of paroxysmal AF, syncope s/p ILR implant 10/2020, PFO, HTN, HLD, and MAY presenting for follow up.\par \par Patient previously followed with Dr. Healy and was last seen by him in office 8/2022.\par \par In Feb 2023 the EP was alerted to an NSVT event, for which he was ordered for a CCTA which subsequently revealed moderate mLAD stenosis (CT-FFR pending). He notes dyspnea/fatigue on exertion without overt chest discomfort.\par \par CCTA 6/9/2023\par -  (all LAD; 49%ile for age/gender/ethnicity)\par - CAD-RADS 3: calcified plaque within the proximal and mLAD resulting in up to moderate narrowing.\par - minimal narrowing within the mLCx. Patent RCA.\par \par NM Stress 8/2022\par - Lexiscan study negative for ischemia\par \par TTE 12/2021\par - Normal biventricular function, mild LVH, mild LA dilatation, mod MR, mild Ao sclerosis\par \par Labs:\par - , HDL 45, LDL 48, non-HDL 60, TG 62\par - Cr 0.9, K 4.0

## 2023-06-14 NOTE — PHYSICAL EXAM
[Well Developed] : well developed [Well Nourished] : well nourished [No Acute Distress] : no acute distress [Obese] : obese [Normal Conjunctiva] : normal conjunctiva [Normal Venous Pressure] : normal venous pressure [No Carotid Bruit] : no carotid bruit [Normal S1, S2] : normal S1, S2 [No Murmur] : no murmur [No Rub] : no rub [No Gallop] : no gallop [Clear Lung Fields] : clear lung fields [Good Air Entry] : good air entry [No Respiratory Distress] : no respiratory distress  [Soft] : abdomen soft [Non Tender] : non-tender [No Masses/organomegaly] : no masses/organomegaly [Normal Bowel Sounds] : normal bowel sounds [Normal Gait] : normal gait [No Edema] : no edema [No Cyanosis] : no cyanosis [No Clubbing] : no clubbing [No Varicosities] : no varicosities [No Rash] : no rash [No Skin Lesions] : no skin lesions [Moves all extremities] : moves all extremities [No Focal Deficits] : no focal deficits [Normal Speech] : normal speech [Alert and Oriented] : alert and oriented [Normal memory] : normal memory [de-identified] : Large neck

## 2023-06-14 NOTE — ASSESSMENT
[FreeTextEntry1] : Mr. Tao is a 72-year-old man with history of CAD via CCTA, paroxysmal AF, syncope s/p ILR implant 10/2020, PFO, HTN, HLD, and MAY presenting for follow up.\par \par Impression:\par (1) CAD discovered via CTA ordered after patient had a run of NSVT on his ILR. Notable for moderate mLAD stenosis, CAD-RADS 3 with CT-FFR pending. Has some dyspnea on exertion, chronic, without chest discomfort.\par (2) Paroxysmal AF, CHADSVASc at least 2 (Age, HTN), on AC, in NSR, stable\par (3) Syncope, s/p ILR 10/2020, no events thus far, following with EP\par (4) HTN, well controlled\par (5) HLD, well controlled\par (6) Class III obesity\par (7) MAY\par \par Plan:\par - Awaiting results of CT-FFR; if positive will refer for LHC and PCI of the mLAD.\par - If CT-FFR is negative will continue medical management, including addition of amlodipine as a second antianginal (+improved BP control).\par - Continue AC\par - Continue Multaq for now - defer decision re long term use to EP\par - Continue metoprolol 50mg BID\par - Continue atorvastatin 40mg\par - Continue benazepril 40mg daily\par \par RTC pending CT-FFR results

## 2023-06-15 ENCOUNTER — RESULT REVIEW (OUTPATIENT)
Age: 73
End: 2023-06-15

## 2023-06-15 PROCEDURE — 0504T: CPT

## 2023-06-19 DIAGNOSIS — R00.2 PALPITATIONS: ICD-10-CM

## 2023-06-20 DIAGNOSIS — R00.2 PALPITATIONS: ICD-10-CM

## 2023-07-14 ENCOUNTER — APPOINTMENT (OUTPATIENT)
Dept: ELECTROPHYSIOLOGY | Facility: CLINIC | Age: 73
End: 2023-07-14
Payer: MEDICARE

## 2023-07-14 VITALS
WEIGHT: 315 LBS | RESPIRATION RATE: 16 BRPM | HEIGHT: 73 IN | HEART RATE: 66 BPM | SYSTOLIC BLOOD PRESSURE: 140 MMHG | DIASTOLIC BLOOD PRESSURE: 82 MMHG | TEMPERATURE: 97.1 F | BODY MASS INDEX: 41.75 KG/M2

## 2023-07-14 DIAGNOSIS — Z51.81 ENCOUNTER FOR THERAPEUTIC DRUG LVL MONITORING: ICD-10-CM

## 2023-07-14 PROCEDURE — 93291 INTERROG DEV EVAL SCRMS IP: CPT

## 2023-07-14 PROCEDURE — 93000 ELECTROCARDIOGRAM COMPLETE: CPT | Mod: 59

## 2023-07-18 RX ORDER — METOPROLOL TARTRATE 50 MG/1
50 TABLET, FILM COATED ORAL
Qty: 180 | Refills: 3 | Status: ACTIVE | COMMUNITY
Start: 2020-12-16

## 2023-07-18 NOTE — ASSESSMENT
[FreeTextEntry1] : s/p loop implant for syncope in 2020\par \par # PAF \par on xarelto 20mg daily - denies any bleeding issue\par On multaq 400mg twice daily -\par \par # NSVT on 2/15 - \par s/p CT angio - moderate narrowing of mid LAD\par FFR - LAD distal segment .81\par c/w metoprolol\par \par # no events of syncope or near syncope.\par \par BW- sent for TSH and T4\par \par \par #Enrolled on remote, transmitting\par \par RTO in 6 months. wants to discuss stopping multaq.\par follow up with Dr. Amato. \par \par \par \par

## 2023-07-18 NOTE — CARDIOLOGY SUMMARY
[de-identified] : ECG ( 7/14/2023) 66 bpm sinus \par ECG ( 11/18/2020) 64bpm sinus rhythm  [de-identified] : \par ECHO ( 10/12/2020) EF 55-65% [de-identified] : CT angio ( 6/9/2023) h/o nsvt 2/25/2023 - calcified plaques in the proximal LAD resulting to moderate narrowing of the mid LAD.\par FFR ( 6/15/2023)  LAD distal segment is 0.81  ( see FFR result) [de-identified] : 10/13/2020 - Medtronic reveal 10/13/2020

## 2023-07-18 NOTE — PROCEDURE
[No] : not [None] : none [NSR] : normal sinus rhythm [See Scanned Paceart Report] : See scanned paceart report [See Device Printout] : See device printout [Longevity: ___ months] : The estimated remaining battery life is [unfilled] months [de-identified] : Medtronic [de-identified] : LINQ [de-identified] : [id number] [de-identified] : 10/13/2020 [de-identified] : nsvt on 2/25/2023 - seen, patient had CT angio done.\par No new  events.

## 2023-07-18 NOTE — HISTORY OF PRESENT ILLNESS
[de-identified] : Cardiologist: Dr. Amato\par 72 years old admitted to [facility] on 10/11/2020 for syncope  and fall. PMH includes HTN, afib on xarelto and multaq , has intermittent dizziness, syncope.\par \par Patient sustained acute fractures involving C2, T1, T2 and T3\par Nuero referred to EP -An ILR was implanted  on 10/13/2020 for long term monitoring for syncope \par \par Presents for   device interrogation\par \par \par Denies any sob, RUST, PND, orthopnea, no palpitations, no dizziness,lightheadedness, denies chest pains\par

## 2023-09-13 ENCOUNTER — APPOINTMENT (OUTPATIENT)
Dept: CARDIOLOGY | Facility: CLINIC | Age: 73
End: 2023-09-13
Payer: MEDICARE

## 2023-09-13 ENCOUNTER — NON-APPOINTMENT (OUTPATIENT)
Age: 73
End: 2023-09-13

## 2023-09-14 PROCEDURE — G2066: CPT

## 2023-09-14 PROCEDURE — 93298 REM INTERROG DEV EVAL SCRMS: CPT

## 2023-10-18 ENCOUNTER — APPOINTMENT (OUTPATIENT)
Dept: CARDIOLOGY | Facility: CLINIC | Age: 73
End: 2023-10-18
Payer: MEDICARE

## 2023-10-18 ENCOUNTER — NON-APPOINTMENT (OUTPATIENT)
Age: 73
End: 2023-10-18

## 2023-10-19 PROCEDURE — 93298 REM INTERROG DEV EVAL SCRMS: CPT

## 2023-10-19 PROCEDURE — G2066: CPT

## 2023-11-22 ENCOUNTER — NON-APPOINTMENT (OUTPATIENT)
Age: 73
End: 2023-11-22

## 2023-11-22 ENCOUNTER — APPOINTMENT (OUTPATIENT)
Dept: CARDIOLOGY | Facility: CLINIC | Age: 73
End: 2023-11-22
Payer: MEDICARE

## 2023-11-23 PROCEDURE — G2066: CPT | Mod: NC

## 2023-11-23 PROCEDURE — 93298 REM INTERROG DEV EVAL SCRMS: CPT

## 2023-12-07 ENCOUNTER — APPOINTMENT (OUTPATIENT)
Dept: CARDIOLOGY | Facility: CLINIC | Age: 73
End: 2023-12-07
Payer: MEDICARE

## 2023-12-07 PROCEDURE — 93306 TTE W/DOPPLER COMPLETE: CPT

## 2023-12-24 LAB
ALBUMIN SERPL ELPH-MCNC: 3.9 G/DL
ALP BLD-CCNC: 108 U/L
ALT SERPL-CCNC: 9 U/L
ANION GAP SERPL CALC-SCNC: 9 MMOL/L
AST SERPL-CCNC: 12 U/L
BILIRUB SERPL-MCNC: 0.5 MG/DL
BUN SERPL-MCNC: 13 MG/DL
CALCIUM SERPL-MCNC: 9.1 MG/DL
CHLORIDE SERPL-SCNC: 105 MMOL/L
CHOLEST SERPL-MCNC: 92 MG/DL
CO2 SERPL-SCNC: 26 MMOL/L
CREAT SERPL-MCNC: 0.9 MG/DL
CRP SERPL HS-MCNC: 5.92 MG/L
EGFR: 90 ML/MIN/1.73M2
ESTIMATED AVERAGE GLUCOSE: 111 MG/DL
GLUCOSE SERPL-MCNC: 95 MG/DL
HBA1C MFR BLD HPLC: 5.5 %
HDLC SERPL-MCNC: 37 MG/DL
LDLC SERPL CALC-MCNC: 47 MG/DL
NONHDLC SERPL-MCNC: 55 MG/DL
NT-PROBNP SERPL-MCNC: <5 PG/ML
POTASSIUM SERPL-SCNC: 4.7 MMOL/L
PROT SERPL-MCNC: 6.4 G/DL
SODIUM SERPL-SCNC: 140 MMOL/L
TRIGL SERPL-MCNC: 39 MG/DL
TSH SERPL-ACNC: 1.05 UIU/ML

## 2024-01-08 ENCOUNTER — APPOINTMENT (OUTPATIENT)
Dept: CARDIOLOGY | Facility: CLINIC | Age: 74
End: 2024-01-08
Payer: MEDICARE

## 2024-01-08 VITALS
HEIGHT: 73 IN | BODY MASS INDEX: 41.75 KG/M2 | WEIGHT: 315 LBS | SYSTOLIC BLOOD PRESSURE: 138 MMHG | DIASTOLIC BLOOD PRESSURE: 84 MMHG

## 2024-01-08 PROCEDURE — 99214 OFFICE O/P EST MOD 30 MIN: CPT

## 2024-01-08 PROCEDURE — 93000 ELECTROCARDIOGRAM COMPLETE: CPT

## 2024-01-11 NOTE — PHYSICAL EXAM
[Well Developed] : well developed [Well Nourished] : well nourished [No Acute Distress] : no acute distress [Obese] : obese [Normal Conjunctiva] : normal conjunctiva [Normal Venous Pressure] : normal venous pressure [No Carotid Bruit] : no carotid bruit [Normal S1, S2] : normal S1, S2 [No Murmur] : no murmur [No Rub] : no rub [No Gallop] : no gallop [Clear Lung Fields] : clear lung fields [Good Air Entry] : good air entry [No Respiratory Distress] : no respiratory distress  [Soft] : abdomen soft [Non Tender] : non-tender [No Masses/organomegaly] : no masses/organomegaly [Normal Bowel Sounds] : normal bowel sounds [Normal Gait] : normal gait [No Edema] : no edema [No Cyanosis] : no cyanosis [No Clubbing] : no clubbing [No Varicosities] : no varicosities [No Rash] : no rash [No Skin Lesions] : no skin lesions [Moves all extremities] : moves all extremities [No Focal Deficits] : no focal deficits [Normal Speech] : normal speech [Alert and Oriented] : alert and oriented [Normal memory] : normal memory [de-identified] : Large neck

## 2024-01-11 NOTE — HISTORY OF PRESENT ILLNESS
[FreeTextEntry1] : Mr. Tao is a 73-year-old man with history of moderate single vessel CAD via CCTA 6/2023 (mLAD, FFR negative), paroxysmal AF, syncope s/p ILR implant 10/2020, PFO, HTN, HLD, and MAY presenting for follow up.  Patient previously followed with Dr. Healy and was last seen by him in office 8/2022.  In Feb 2023 EP was alerted to an NSVT event, for which he was ordered for a CCTA which subsequently revealed moderate mLAD stenosis (CT-FFR negative as below). He notes dyspnea/fatigue on exertion without overt chest discomfort.  CCTA 6/9/2023 -  (all LAD; 49%ile for age/gender/ethnicity) - CAD-RADS 3: calcified plaque within the proximal and mLAD resulting in up to moderate narrowing. - minimal narrowing within the mLCx. Patent RCA. - CT-FFR: LM 0.99, LAD extending to the distal segment at 0.81, LCx 0.83 distally, RCA 0.95 distally  NM Stress 8/2022 - Lexiscan study negative for ischemia  TTE 12/2021 - Normal biventricular function, mild LVH, mild LA dilatation, mod MR, mild Ao sclerosis TTE 12/2023 - normal systolic function, possible hypokinesis of the inferoseptal/inferior wall segments (poor endocardial visualization), G1DD, sclerotic Ao, MAC, no effusion.  Labs: - Hgb 13.1, Plt 222 - A1c 5.5%, TSH 1.05, pBNP <5, hsCRP 5.9 - TC 92, LDL 47, HDL 37, TG 39, non-HDL 55 - Cr 0.9, K 4.7, normal transaminases

## 2024-01-11 NOTE — ASSESSMENT
[FreeTextEntry1] : Mr. Tao is a 73-year-old man with history of CAD via CCTA, paroxysmal AF, syncope s/p ILR implant 10/2020, PFO, HTN, HLD, and MAY presenting for follow up.  Impression: (1) CAD discovered via CTA ordered after patient had a run of NSVT on his ILR. Notable for moderate mLAD stenosis, CAD-RADS 3 with CT-FFR pending. Has some dyspnea on exertion, chronic, without chest discomfort. (2) Paroxysmal AF, CHADSVASc at least 2 (Age, HTN), on AC, in NSR, stable (3) Syncope, s/p ILR 10/2020, no events thus far, following with EP (4) HTN, well controlled (5) HLD, well controlled, LDL 47 (6) Class III obesity (7) MAY  Plan: - Management of CAD: AC, statin, BB, CCB. For persistent symptoms can increase amlodipine to 10mg, add Ranexa, or add Imdur. ED precautions for severe or persistent chest pain/dyspnea. - Repeat TTE next year - Continue Multaq for now - defer decision re long term use to EP  RTC 6 months, sooner as needed

## 2024-01-12 ENCOUNTER — APPOINTMENT (OUTPATIENT)
Dept: ELECTROPHYSIOLOGY | Facility: CLINIC | Age: 74
End: 2024-01-12
Payer: MEDICARE

## 2024-01-12 VITALS — SYSTOLIC BLOOD PRESSURE: 126 MMHG | DIASTOLIC BLOOD PRESSURE: 82 MMHG

## 2024-01-12 VITALS
TEMPERATURE: 98 F | WEIGHT: 315 LBS | DIASTOLIC BLOOD PRESSURE: 85 MMHG | SYSTOLIC BLOOD PRESSURE: 153 MMHG | BODY MASS INDEX: 41.75 KG/M2 | HEART RATE: 73 BPM | HEIGHT: 73 IN

## 2024-01-12 DIAGNOSIS — I47.29 OTHER VENTRICULAR TACHYCARDIA: ICD-10-CM

## 2024-01-12 DIAGNOSIS — Z45.09 ENCOUNTER FOR ADJUSTMENT AND MANAGEMENT OF OTHER CARDIAC DEVICE: ICD-10-CM

## 2024-01-12 PROCEDURE — 99214 OFFICE O/P EST MOD 30 MIN: CPT

## 2024-01-12 NOTE — CARDIOLOGY SUMMARY
[de-identified] : ECG ( 7/14/2023) 66 bpm sinus \par  ECG ( 11/18/2020) 64bpm sinus rhythm  [de-identified] : \par  ECHO ( 10/12/2020) EF 55-65% [de-identified] : CT angio ( 6/9/2023) h/o nsvt 2/25/2023 - calcified plaques in the proximal LAD resulting to moderate narrowing of the mid LAD.\par  FFR ( 6/15/2023)  LAD distal segment is 0.81  ( see FFR result) [de-identified] : 10/13/2020 - Medtronic reveal 10/13/2020

## 2024-01-12 NOTE — ASSESSMENT
[FreeTextEntry1] : s/p loop implant for syncope in 2020   # PAF  - on xarelto 20mg daily - denies any bleeding issue  - On multaq 400mg twice daily. LFTs, TSH WNL.  - No events on ILR. No AF. - Discussed impending ILR reaching LAYO. He understands that he will no longer be remotely monitored at that point. Discussed ILR explant, risks/benefits. <1% of complication, including bleeding and infection. He opts to proceed with explant. Will call when device reached LAYO, no need for OV. All questions answered  # HTN - BP rechecked and WNL - C/w meds  # NSVT on 2/15 -  - s/p CT angio - moderate narrowing of mid LAD  - FFR - LAD distal segment.81  - c/w metoprolol tartrate 50mg BID - F/U cardiology    # Syncope - No recurrence  F/U cardiology, EP PRN. Will call when ILR EOS.

## 2024-01-12 NOTE — PROCEDURE
[No] : not [NSR] : normal sinus rhythm [See Scanned Paceart Report] : See scanned paceart report [See Device Printout] : See device printout [Longevity: ___ months] : The estimated remaining battery life is [unfilled] months [None] : none [de-identified] : Medtronic [de-identified] : LINQ [de-identified] : [id number] [de-identified] : 10/13/2020 [de-identified] : No new  events.

## 2024-01-12 NOTE — PHYSICAL EXAM
[General Appearance - Well Developed] : well developed [Normal Appearance] : normal appearance [Well Groomed] : well groomed [General Appearance - Well Nourished] : well nourished [No Deformities] : no deformities [General Appearance - In No Acute Distress] : no acute distress [Heart Rate And Rhythm] : heart rate and rhythm were normal [Heart Sounds] : normal S1 and S2 [Murmurs] : no murmurs present [Clean] : clean [Dry] : dry [Well-Healed] : well-healed [Abdomen Soft] : soft [Nail Clubbing] : no clubbing of the fingernails [Cyanosis, Localized] : no localized cyanosis [] : no respiratory distress

## 2024-01-12 NOTE — HISTORY OF PRESENT ILLNESS
[de-identified] : Cardiologist: Dr. Amato  73 years old admitted to [facility] on 10/11/2020 for syncope and fall. PMHx includes HTN, afib on xarelto and multaq, has intermittent dizziness, syncope.  Patient sustained acute fractures involving C2, T1, T2 and T3 An ILR was implanted  on 10/13/2020 for long term monitoring for syncope   Presents for routine follow up. The patient is feeling well and has no cardiac complaints. Denies CP, palpitations, SOB, dizziness, PND, syncope.  Chronic RUST.

## 2024-01-18 ENCOUNTER — APPOINTMENT (OUTPATIENT)
Dept: CARDIOLOGY | Facility: CLINIC | Age: 74
End: 2024-01-18

## 2024-01-18 ENCOUNTER — APPOINTMENT (OUTPATIENT)
Dept: CARDIOTHORACIC SURGERY | Facility: CLINIC | Age: 74
End: 2024-01-18

## 2024-02-16 ENCOUNTER — APPOINTMENT (OUTPATIENT)
Dept: CARDIOLOGY | Facility: CLINIC | Age: 74
End: 2024-02-16
Payer: MEDICARE

## 2024-02-16 ENCOUNTER — NON-APPOINTMENT (OUTPATIENT)
Age: 74
End: 2024-02-16

## 2024-02-16 PROCEDURE — 93298 REM INTERROG DEV EVAL SCRMS: CPT

## 2024-03-21 ENCOUNTER — NON-APPOINTMENT (OUTPATIENT)
Age: 74
End: 2024-03-21

## 2024-03-22 ENCOUNTER — APPOINTMENT (OUTPATIENT)
Dept: CARDIOLOGY | Facility: CLINIC | Age: 74
End: 2024-03-22
Payer: MEDICARE

## 2024-03-22 PROCEDURE — 93298 REM INTERROG DEV EVAL SCRMS: CPT

## 2024-04-25 ENCOUNTER — RX RENEWAL (OUTPATIENT)
Age: 74
End: 2024-04-25

## 2024-04-25 ENCOUNTER — NON-APPOINTMENT (OUTPATIENT)
Age: 74
End: 2024-04-25

## 2024-04-25 RX ORDER — RIVAROXABAN 20 MG/1
20 TABLET, FILM COATED ORAL
Qty: 90 | Refills: 3 | Status: ACTIVE | COMMUNITY
Start: 1900-01-01 | End: 1900-01-01

## 2024-04-26 ENCOUNTER — APPOINTMENT (OUTPATIENT)
Dept: CARDIOLOGY | Facility: CLINIC | Age: 74
End: 2024-04-26
Payer: MEDICARE

## 2024-04-26 PROCEDURE — 93298 REM INTERROG DEV EVAL SCRMS: CPT

## 2024-05-01 ENCOUNTER — RX RENEWAL (OUTPATIENT)
Age: 74
End: 2024-05-01

## 2024-05-01 RX ORDER — BENAZEPRIL HYDROCHLORIDE 40 MG/1
40 TABLET, FILM COATED ORAL
Qty: 90 | Refills: 3 | Status: ACTIVE | COMMUNITY
Start: 2021-05-30 | End: 1900-01-01

## 2024-05-30 ENCOUNTER — NON-APPOINTMENT (OUTPATIENT)
Age: 74
End: 2024-05-30

## 2024-05-31 ENCOUNTER — APPOINTMENT (OUTPATIENT)
Dept: CARDIOLOGY | Facility: CLINIC | Age: 74
End: 2024-05-31
Payer: MEDICARE

## 2024-05-31 PROCEDURE — 93298 REM INTERROG DEV EVAL SCRMS: CPT

## 2024-06-06 ENCOUNTER — RX RENEWAL (OUTPATIENT)
Age: 74
End: 2024-06-06

## 2024-06-06 RX ORDER — ATORVASTATIN CALCIUM 40 MG/1
40 TABLET, FILM COATED ORAL
Qty: 90 | Refills: 3 | Status: ACTIVE | COMMUNITY
Start: 2020-11-12 | End: 1900-01-01

## 2024-06-10 ENCOUNTER — APPOINTMENT (OUTPATIENT)
Dept: CARDIOLOGY | Facility: CLINIC | Age: 74
End: 2024-06-10
Payer: MEDICARE

## 2024-06-10 PROCEDURE — 93306 TTE W/DOPPLER COMPLETE: CPT

## 2024-06-17 ENCOUNTER — APPOINTMENT (OUTPATIENT)
Dept: CARDIOLOGY | Facility: CLINIC | Age: 74
End: 2024-06-17
Payer: MEDICARE

## 2024-06-17 VITALS
DIASTOLIC BLOOD PRESSURE: 80 MMHG | WEIGHT: 315 LBS | HEIGHT: 73 IN | SYSTOLIC BLOOD PRESSURE: 106 MMHG | BODY MASS INDEX: 41.75 KG/M2 | HEART RATE: 68 BPM

## 2024-06-17 DIAGNOSIS — I48.0 PAROXYSMAL ATRIAL FIBRILLATION: ICD-10-CM

## 2024-06-17 DIAGNOSIS — I10 ESSENTIAL (PRIMARY) HYPERTENSION: ICD-10-CM

## 2024-06-17 DIAGNOSIS — I25.10 ATHEROSCLEROTIC HEART DISEASE OF NATIVE CORONARY ARTERY W/OUT ANGINA PECTORIS: ICD-10-CM

## 2024-06-17 LAB
ALBUMIN SERPL ELPH-MCNC: 4 G/DL
ALP BLD-CCNC: 123 U/L
ALT SERPL-CCNC: 15 U/L
ANION GAP SERPL CALC-SCNC: 10 MMOL/L
AST SERPL-CCNC: 20 U/L
BILIRUB SERPL-MCNC: 0.5 MG/DL
BUN SERPL-MCNC: 14 MG/DL
CALCIUM SERPL-MCNC: 9 MG/DL
CHLORIDE SERPL-SCNC: 103 MMOL/L
CHOLEST SERPL-MCNC: 103 MG/DL
CO2 SERPL-SCNC: 23 MMOL/L
CREAT SERPL-MCNC: 1 MG/DL
EGFR: 79 ML/MIN/1.73M2
ESTIMATED AVERAGE GLUCOSE: 117 MG/DL
GLUCOSE SERPL-MCNC: 104 MG/DL
HBA1C MFR BLD HPLC: 5.7 %
HCT VFR BLD CALC: 41.3 %
HDLC SERPL-MCNC: 34 MG/DL
HGB BLD-MCNC: 13.8 G/DL
LDLC SERPL CALC-MCNC: 59 MG/DL
MCHC RBC-ENTMCNC: 28.8 PG
MCHC RBC-ENTMCNC: 33.4 G/DL
MCV RBC AUTO: 86 FL
NONHDLC SERPL-MCNC: 69 MG/DL
NT-PROBNP SERPL-MCNC: <36 PG/ML
PLATELET # BLD AUTO: 242 K/UL
PMV BLD AUTO: 0 /100 WBCS
PMV BLD: 8.4 FL
POTASSIUM SERPL-SCNC: 4.7 MMOL/L
PROT SERPL-MCNC: 6.8 G/DL
RBC # BLD: 4.8 M/UL
RBC # FLD: 13.9 %
SODIUM SERPL-SCNC: 136 MMOL/L
TRIGL SERPL-MCNC: 51 MG/DL
TSH SERPL-ACNC: 1.29 UIU/ML
WBC # FLD AUTO: 6.53 K/UL

## 2024-06-17 PROCEDURE — 93000 ELECTROCARDIOGRAM COMPLETE: CPT

## 2024-06-17 PROCEDURE — 99214 OFFICE O/P EST MOD 30 MIN: CPT

## 2024-06-17 NOTE — PHYSICAL EXAM
[Well Developed] : well developed [Well Nourished] : well nourished [No Acute Distress] : no acute distress [Obese] : obese [Normal Conjunctiva] : normal conjunctiva [Normal Venous Pressure] : normal venous pressure [No Carotid Bruit] : no carotid bruit [Normal S1, S2] : normal S1, S2 [No Murmur] : no murmur [No Rub] : no rub [No Gallop] : no gallop [Clear Lung Fields] : clear lung fields [Good Air Entry] : good air entry [No Respiratory Distress] : no respiratory distress  [Soft] : abdomen soft [Non Tender] : non-tender [No Masses/organomegaly] : no masses/organomegaly [Normal Bowel Sounds] : normal bowel sounds [Normal Gait] : normal gait [No Edema] : no edema [No Cyanosis] : no cyanosis [No Clubbing] : no clubbing [No Varicosities] : no varicosities [No Rash] : no rash [No Skin Lesions] : no skin lesions [Moves all extremities] : moves all extremities [No Focal Deficits] : no focal deficits [Normal Speech] : normal speech [Alert and Oriented] : alert and oriented [Normal memory] : normal memory [de-identified] : Large neck

## 2024-06-17 NOTE — HISTORY OF PRESENT ILLNESS
[FreeTextEntry1] : Mr. Tao is a 73-year-old man with history of moderate single vessel CAD via CCTA 6/2023 (mLAD, FFR negative), paroxysmal AF, syncope s/p ILR implant 10/2020, PFO, HTN, HLD, and MAY presenting for follow up.  Patient previously followed with Dr. Healy and was last seen by him in office 8/2022.  In Feb 2023 EP was alerted to an NSVT event, for which he was ordered for a CCTA which subsequently revealed moderate mLAD stenosis (CT-FFR negative as below). He notes dyspnea/fatigue on exertion without overt chest discomfort, stable over several years.  CCTA 6/9/2023 -  (all LAD; 49%ile for age/gender/ethnicity) - CAD-RADS 3: calcified plaque within the proximal and mLAD resulting in up to moderate narrowing. - minimal narrowing within the mLCx. Patent RCA. - CT-FFR: LM 0.99, LAD extending to the distal segment at 0.81, LCx 0.83 distally, RCA 0.95 distally  NM Stress 8/2022 - Lexiscan study negative for ischemia  TTE 12/2021 - Normal biventricular function, mild LVH, mild LA dilatation, mod MR, mild Ao sclerosis TTE 12/2023 - normal systolic function, possible hypokinesis of the inferoseptal/inferior wall segments (poor endocardial visualization), G1DD, sclerotic Ao, MAC, no effusion. TTE 6/2024: normal systolic function, mildly dilated LV cavity, AoV not well visualized  Labs: - Hgb 13.1, Plt 222 - A1c 5.5%, TSH 1.05, pBNP <5, hsCRP 5.9 - TC 92, LDL 47, HDL 37, TG 39, non-HDL 55 - Cr 0.9, K 4.7, normal transaminases

## 2024-06-17 NOTE — ASSESSMENT
[FreeTextEntry1] : Mr. Tao is a 73-year-old man with history of CAD via CCTA, paroxysmal AF, syncope s/p ILR implant 10/2020, PFO, HTN, HLD, and MAY presenting for follow up.  Impression: (1) CAD discovered via CTA ordered after patient had a run of NSVT on his ILR. Notable for moderate mLAD stenosis, CAD-RADS 3 with CT-FFR pending. Has some dyspnea on exertion, chronic, without chest discomfort. (2) Paroxysmal AF, CHADSVASc at least 2 (Age, HTN), on AC, in NSR, stable (3) Syncope, s/p ILR 10/2020, no events thus far, following with EP (4) HTN, well controlled (5) HLD, well controlled, LDL 47 (6) Class III obesity (7) MAY  Plan: - Management of CAD: AC, statin, BB, CCB. For persistent symptoms can increase amlodipine to 10mg, add Ranexa, or add Imdur. ED precautions for severe or persistent chest pain/dyspnea. - Cardiometabolic labs ordered - Discussed utility of GLP1a use in light of concurrent obesity and CAD. Patient to consider. - Continue Multaq for now - defer decision re long term use to EP  RTC 6 months, sooner as needed

## 2024-06-18 LAB — CRP SERPL HS-MCNC: 4.47 MG/L

## 2024-06-28 ENCOUNTER — RX RENEWAL (OUTPATIENT)
Age: 74
End: 2024-06-28

## 2024-07-05 ENCOUNTER — APPOINTMENT (OUTPATIENT)
Dept: CARDIOLOGY | Facility: CLINIC | Age: 74
End: 2024-07-05

## 2024-09-03 ENCOUNTER — RX RENEWAL (OUTPATIENT)
Age: 74
End: 2024-09-03

## 2024-12-10 DIAGNOSIS — I25.10 ATHEROSCLEROTIC HEART DISEASE OF NATIVE CORONARY ARTERY W/OUT ANGINA PECTORIS: ICD-10-CM

## 2024-12-10 DIAGNOSIS — I10 ESSENTIAL (PRIMARY) HYPERTENSION: ICD-10-CM

## 2024-12-16 ENCOUNTER — APPOINTMENT (OUTPATIENT)
Dept: CARDIOLOGY | Facility: CLINIC | Age: 74
End: 2024-12-16

## 2025-01-30 ENCOUNTER — APPOINTMENT (OUTPATIENT)
Dept: CARDIOLOGY | Facility: CLINIC | Age: 75
End: 2025-01-30
Payer: MEDICARE

## 2025-01-30 VITALS
DIASTOLIC BLOOD PRESSURE: 68 MMHG | TEMPERATURE: 98 F | HEART RATE: 59 BPM | HEIGHT: 73 IN | WEIGHT: 315 LBS | BODY MASS INDEX: 41.75 KG/M2 | SYSTOLIC BLOOD PRESSURE: 128 MMHG

## 2025-01-30 DIAGNOSIS — I47.29 OTHER VENTRICULAR TACHYCARDIA: ICD-10-CM

## 2025-01-30 DIAGNOSIS — E66.01 MORBID (SEVERE) OBESITY DUE TO EXCESS CALORIES: ICD-10-CM

## 2025-01-30 DIAGNOSIS — I25.10 ATHEROSCLEROTIC HEART DISEASE OF NATIVE CORONARY ARTERY W/OUT ANGINA PECTORIS: ICD-10-CM

## 2025-01-30 DIAGNOSIS — I10 ESSENTIAL (PRIMARY) HYPERTENSION: ICD-10-CM

## 2025-01-30 DIAGNOSIS — I48.0 PAROXYSMAL ATRIAL FIBRILLATION: ICD-10-CM

## 2025-01-30 PROCEDURE — 93000 ELECTROCARDIOGRAM COMPLETE: CPT

## 2025-01-30 PROCEDURE — 99214 OFFICE O/P EST MOD 30 MIN: CPT

## 2025-02-07 RX ORDER — SEMAGLUTIDE 0.25 MG/.5ML
0.25 INJECTION, SOLUTION SUBCUTANEOUS
Qty: 1 | Refills: 2 | Status: ACTIVE | COMMUNITY
Start: 2025-01-30 | End: 1900-01-01

## 2025-04-03 ENCOUNTER — APPOINTMENT (OUTPATIENT)
Dept: CARDIOLOGY | Facility: CLINIC | Age: 75
End: 2025-04-03

## 2025-04-23 ENCOUNTER — RX RENEWAL (OUTPATIENT)
Age: 75
End: 2025-04-23

## 2025-06-02 ENCOUNTER — APPOINTMENT (OUTPATIENT)
Dept: CARDIOLOGY | Facility: CLINIC | Age: 75
End: 2025-06-02

## 2025-06-24 ENCOUNTER — RX RENEWAL (OUTPATIENT)
Age: 75
End: 2025-06-24

## 2025-07-16 ENCOUNTER — NON-APPOINTMENT (OUTPATIENT)
Age: 75
End: 2025-07-16

## 2025-07-17 ENCOUNTER — APPOINTMENT (OUTPATIENT)
Dept: CARDIOLOGY | Facility: CLINIC | Age: 75
End: 2025-07-17
Payer: MEDICARE

## 2025-07-17 VITALS
WEIGHT: 315 LBS | HEIGHT: 73 IN | DIASTOLIC BLOOD PRESSURE: 83 MMHG | BODY MASS INDEX: 41.75 KG/M2 | HEART RATE: 76 BPM | SYSTOLIC BLOOD PRESSURE: 138 MMHG

## 2025-07-17 PROCEDURE — G2211 COMPLEX E/M VISIT ADD ON: CPT

## 2025-07-17 PROCEDURE — 93000 ELECTROCARDIOGRAM COMPLETE: CPT

## 2025-07-17 PROCEDURE — 99214 OFFICE O/P EST MOD 30 MIN: CPT

## 2025-09-16 ENCOUNTER — APPOINTMENT (OUTPATIENT)
Dept: ELECTROPHYSIOLOGY | Facility: CLINIC | Age: 75
End: 2025-09-16
Payer: MEDICARE

## 2025-09-16 VITALS — HEIGHT: 73 IN | DIASTOLIC BLOOD PRESSURE: 81 MMHG | SYSTOLIC BLOOD PRESSURE: 124 MMHG

## 2025-09-16 VITALS — BODY MASS INDEX: 42.22 KG/M2 | WEIGHT: 315 LBS

## 2025-09-16 VITALS — HEART RATE: 74 BPM

## 2025-09-16 DIAGNOSIS — I48.0 PAROXYSMAL ATRIAL FIBRILLATION: ICD-10-CM

## 2025-09-16 DIAGNOSIS — R55 SYNCOPE AND COLLAPSE: ICD-10-CM

## 2025-09-16 PROCEDURE — 99214 OFFICE O/P EST MOD 30 MIN: CPT

## 2025-09-16 PROCEDURE — G2211 COMPLEX E/M VISIT ADD ON: CPT

## 2025-09-16 PROCEDURE — 93000 ELECTROCARDIOGRAM COMPLETE: CPT
